# Patient Record
Sex: MALE | Race: WHITE | NOT HISPANIC OR LATINO | Employment: UNEMPLOYED | URBAN - METROPOLITAN AREA
[De-identification: names, ages, dates, MRNs, and addresses within clinical notes are randomized per-mention and may not be internally consistent; named-entity substitution may affect disease eponyms.]

---

## 2021-03-05 ENCOUNTER — HOSPITAL ENCOUNTER (OUTPATIENT)
Dept: OTHER | Facility: HOSPITAL | Age: 31
Discharge: HOME | End: 2021-03-05

## 2021-03-05 LAB
25(OH)D3 SERPL-MCNC: 30.8 NG/ML (ref 30–100)
ALBUMIN SERPL-MCNC: 4.5 G/DL (ref 3.5–5)
ALBUMIN/GLOB SERPL: 1.8 {RATIO} (ref 1.4–2.6)
ALP SERPL-CCNC: 95 U/L (ref 53–128)
ALT SERPL-CCNC: 45 U/L (ref 10–40)
ANION GAP SERPL CALC-SCNC: 14 MMOL/L (ref 8–19)
AST SERPL-CCNC: 23 U/L (ref 15–50)
BASOPHILS # BLD MANUAL: 0.03 10*3/UL (ref 0–0.2)
BASOPHILS NFR BLD MANUAL: 0.5 % (ref 0–3)
BILIRUB SERPL-MCNC: 0.36 MG/DL (ref 0.2–1.3)
BUN SERPL-MCNC: 7 MG/DL (ref 5–25)
BUN/CREAT SERPL: 6 {RATIO} (ref 6–20)
CALCIUM SERPL-MCNC: 9.7 MG/DL (ref 8.7–10.4)
CHLORIDE SERPL-SCNC: 103 MMOL/L (ref 99–111)
CHOLEST SERPL-MCNC: 235 MG/DL (ref 107–200)
CHOLEST/HDLC SERPL: 6.4 {RATIO} (ref 3–6)
CONV BILI, CONJUGATED: <0.2 MG/DL (ref 0–0.6)
CONV CO2: 28 MMOL/L (ref 22–32)
CONV TOTAL PROTEIN: 7 G/DL (ref 6.3–8.2)
CONV UNCONJUGATED BILIRUBIN: 0.2 MG/DL (ref 0–1.1)
CREAT UR-MCNC: 1.26 MG/DL (ref 0.7–1.2)
DEPRECATED RDW RBC AUTO: 47.2 FL
EOSINOPHIL # BLD MANUAL: 0.15 10*3/UL (ref 0–0.7)
EOSINOPHIL NFR BLD MANUAL: 2.3 % (ref 0–7)
ERYTHROCYTE [DISTWIDTH] IN BLOOD BY AUTOMATED COUNT: 13.9 % (ref 11.5–14.5)
EST. AVERAGE GLUCOSE BLD GHB EST-MCNC: 108 MG/DL
FOLATE SERPL-MCNC: 2.5 NG/ML (ref 4.8–20)
GFR SERPLBLD BASED ON 1.73 SQ M-ARVRAT: >60 ML/MIN/{1.73_M2}
GLOBULIN UR ELPH-MCNC: 2.5 G/DL (ref 2–3.5)
GLUCOSE SERPL-MCNC: 101 MG/DL (ref 70–99)
GRANS (ABSOLUTE): 3.45 10*3/UL (ref 2–8)
GRANS: 53.8 % (ref 30–85)
HBA1C MFR BLD: 15.2 G/DL (ref 14–18)
HBA1C MFR BLD: 5.4 % (ref 3.5–5.7)
HCT VFR BLD AUTO: 44.9 % (ref 42–52)
HDLC SERPL-MCNC: 37 MG/DL (ref 40–60)
IMM GRANULOCYTES # BLD: 0.03 10*3/UL (ref 0–0.54)
IMM GRANULOCYTES NFR BLD: 0.5 % (ref 0–0.43)
LDLC SERPL CALC-MCNC: 121 MG/DL (ref 70–100)
LYMPHOCYTES # BLD MANUAL: 2.23 10*3/UL (ref 1–5)
LYMPHOCYTES NFR BLD MANUAL: 8.1 % (ref 3–10)
MCH RBC QN AUTO: 31 PG (ref 27–31)
MCHC RBC AUTO-ENTMCNC: 33.9 G/DL (ref 33–37)
MCV RBC AUTO: 91.4 FL (ref 80–96)
MONOCYTES # BLD AUTO: 0.52 10*3/UL (ref 0.2–1.2)
OSMOLALITY SERPL CALC.SUM OF ELEC: 290 MOSM/KG (ref 273–304)
PLATELET # BLD AUTO: 280 10*3/UL (ref 130–400)
PMV BLD AUTO: 8.9 FL (ref 7.4–10.4)
POTASSIUM SERPL-SCNC: 4.1 MMOL/L (ref 3.5–5.3)
RBC # BLD AUTO: 4.91 10*6/UL (ref 4.7–6.1)
SODIUM SERPL-SCNC: 141 MMOL/L (ref 135–147)
T4 FREE SERPL-MCNC: 1.3 NG/DL (ref 0.9–1.8)
TRIGL SERPL-MCNC: 447 MG/DL (ref 40–150)
TSH SERPL-ACNC: 1.24 M[IU]/L (ref 0.27–4.2)
VARIANT LYMPHS NFR BLD MANUAL: 34.8 % (ref 20–45)
WBC # BLD AUTO: 6.41 10*3/UL (ref 4.8–10.8)

## 2021-03-07 LAB — T3FREE SERPL-MCNC: 3.6 PG/ML (ref 2–4.4)

## 2021-07-06 ENCOUNTER — OFFICE VISIT (OUTPATIENT)
Dept: FAMILY MEDICINE CLINIC | Age: 31
End: 2021-07-06

## 2021-07-06 ENCOUNTER — LAB (OUTPATIENT)
Dept: LAB | Facility: HOSPITAL | Age: 31
End: 2021-07-06

## 2021-07-06 VITALS
HEIGHT: 68 IN | BODY MASS INDEX: 29.13 KG/M2 | DIASTOLIC BLOOD PRESSURE: 83 MMHG | HEART RATE: 98 BPM | SYSTOLIC BLOOD PRESSURE: 126 MMHG | WEIGHT: 192.2 LBS

## 2021-07-06 DIAGNOSIS — E53.8 FOLATE DEFICIENCY: ICD-10-CM

## 2021-07-06 DIAGNOSIS — E78.5 HYPERLIPIDEMIA, UNSPECIFIED HYPERLIPIDEMIA TYPE: ICD-10-CM

## 2021-07-06 DIAGNOSIS — E78.5 HYPERLIPIDEMIA, UNSPECIFIED HYPERLIPIDEMIA TYPE: Primary | ICD-10-CM

## 2021-07-06 DIAGNOSIS — R29.818 SUSPECTED SLEEP APNEA: ICD-10-CM

## 2021-07-06 DIAGNOSIS — F20.0 PARANOID SCHIZOPHRENIA (HCC): ICD-10-CM

## 2021-07-06 PROBLEM — K21.9 GERD (GASTROESOPHAGEAL REFLUX DISEASE): Status: ACTIVE | Noted: 2021-07-06

## 2021-07-06 PROBLEM — Z98.890 S/P ATRIOVENTRICULAR NODAL ABLATION: Status: ACTIVE | Noted: 2021-07-06

## 2021-07-06 PROBLEM — I47.10 SVT (SUPRAVENTRICULAR TACHYCARDIA): Status: ACTIVE | Noted: 2021-07-06

## 2021-07-06 PROBLEM — I47.1 SVT (SUPRAVENTRICULAR TACHYCARDIA) (HCC): Status: ACTIVE | Noted: 2021-07-06

## 2021-07-06 PROBLEM — F51.01 PRIMARY INSOMNIA: Status: ACTIVE | Noted: 2021-07-06

## 2021-07-06 LAB
ALBUMIN SERPL-MCNC: 4.6 G/DL (ref 3.5–5.2)
ALBUMIN/GLOB SERPL: 1.9 G/DL
ALP SERPL-CCNC: 95 U/L (ref 39–117)
ALT SERPL W P-5'-P-CCNC: 45 U/L (ref 1–41)
ANION GAP SERPL CALCULATED.3IONS-SCNC: 10.9 MMOL/L (ref 5–15)
AST SERPL-CCNC: 24 U/L (ref 1–40)
BASOPHILS # BLD AUTO: 0.04 10*3/MM3 (ref 0–0.2)
BASOPHILS NFR BLD AUTO: 0.5 % (ref 0–1.5)
BILIRUB SERPL-MCNC: 0.4 MG/DL (ref 0–1.2)
BUN SERPL-MCNC: 8 MG/DL (ref 6–20)
BUN/CREAT SERPL: 7 (ref 7–25)
CALCIUM SPEC-SCNC: 9.4 MG/DL (ref 8.6–10.5)
CHLORIDE SERPL-SCNC: 102 MMOL/L (ref 98–107)
CHOLEST SERPL-MCNC: 211 MG/DL (ref 0–200)
CO2 SERPL-SCNC: 26.1 MMOL/L (ref 22–29)
CREAT SERPL-MCNC: 1.15 MG/DL (ref 0.76–1.27)
DEPRECATED RDW RBC AUTO: 46.4 FL (ref 37–54)
EOSINOPHIL # BLD AUTO: 0.08 10*3/MM3 (ref 0–0.4)
EOSINOPHIL NFR BLD AUTO: 1.1 % (ref 0.3–6.2)
ERYTHROCYTE [DISTWIDTH] IN BLOOD BY AUTOMATED COUNT: 13.6 % (ref 12.3–15.4)
FOLATE SERPL-MCNC: 7.26 NG/ML (ref 4.78–24.2)
GFR SERPL CREATININE-BSD FRML MDRD: 73 ML/MIN/1.73
GLOBULIN UR ELPH-MCNC: 2.4 GM/DL
GLUCOSE SERPL-MCNC: 99 MG/DL (ref 65–99)
HCT VFR BLD AUTO: 44.8 % (ref 37.5–51)
HDLC SERPL-MCNC: 43 MG/DL (ref 40–60)
HGB BLD-MCNC: 15.4 G/DL (ref 13–17.7)
IMM GRANULOCYTES # BLD AUTO: 0.02 10*3/MM3 (ref 0–0.05)
IMM GRANULOCYTES NFR BLD AUTO: 0.3 % (ref 0–0.5)
LDLC SERPL CALC-MCNC: 106 MG/DL (ref 0–100)
LDLC/HDLC SERPL: 2.2 {RATIO}
LYMPHOCYTES # BLD AUTO: 2.67 10*3/MM3 (ref 0.7–3.1)
LYMPHOCYTES NFR BLD AUTO: 36.3 % (ref 19.6–45.3)
MCH RBC QN AUTO: 31.6 PG (ref 26.6–33)
MCHC RBC AUTO-ENTMCNC: 34.4 G/DL (ref 31.5–35.7)
MCV RBC AUTO: 92 FL (ref 79–97)
MONOCYTES # BLD AUTO: 0.59 10*3/MM3 (ref 0.1–0.9)
MONOCYTES NFR BLD AUTO: 8 % (ref 5–12)
NEUTROPHILS NFR BLD AUTO: 3.95 10*3/MM3 (ref 1.7–7)
NEUTROPHILS NFR BLD AUTO: 53.8 % (ref 42.7–76)
PLATELET # BLD AUTO: 274 10*3/MM3 (ref 140–450)
PMV BLD AUTO: 9.6 FL (ref 6–12)
POTASSIUM SERPL-SCNC: 3.6 MMOL/L (ref 3.5–5.2)
PROT SERPL-MCNC: 7 G/DL (ref 6–8.5)
RBC # BLD AUTO: 4.87 10*6/MM3 (ref 4.14–5.8)
SODIUM SERPL-SCNC: 139 MMOL/L (ref 136–145)
TRIGL SERPL-MCNC: 367 MG/DL (ref 0–150)
VLDLC SERPL-MCNC: 62 MG/DL (ref 5–40)
WBC # BLD AUTO: 7.35 10*3/MM3 (ref 3.4–10.8)

## 2021-07-06 PROCEDURE — 82746 ASSAY OF FOLIC ACID SERUM: CPT

## 2021-07-06 PROCEDURE — 99204 OFFICE O/P NEW MOD 45 MIN: CPT | Performed by: FAMILY MEDICINE

## 2021-07-06 PROCEDURE — 80061 LIPID PANEL: CPT

## 2021-07-06 PROCEDURE — 85025 COMPLETE CBC W/AUTO DIFF WBC: CPT

## 2021-07-06 PROCEDURE — 36415 COLL VENOUS BLD VENIPUNCTURE: CPT

## 2021-07-06 PROCEDURE — 80053 COMPREHEN METABOLIC PANEL: CPT

## 2021-07-06 RX ORDER — OMEPRAZOLE 20 MG/1
20 CAPSULE, DELAYED RELEASE ORAL DAILY
COMMUNITY
End: 2021-09-03 | Stop reason: SDUPTHER

## 2021-07-06 RX ORDER — FOLIC ACID 1 MG/1
1 TABLET ORAL DAILY
COMMUNITY
Start: 2021-06-07

## 2021-07-06 RX ORDER — BUSPIRONE HYDROCHLORIDE 7.5 MG/1
7.5 TABLET ORAL 3 TIMES DAILY
COMMUNITY
Start: 2021-06-24

## 2021-07-06 RX ORDER — CHLORAL HYDRATE 500 MG
CAPSULE ORAL
COMMUNITY

## 2021-07-06 RX ORDER — FLUOXETINE HYDROCHLORIDE 20 MG/1
60 CAPSULE ORAL DAILY
COMMUNITY
Start: 2021-06-18

## 2021-07-06 RX ORDER — TRAZODONE HYDROCHLORIDE 100 MG/1
.5-1 TABLET ORAL NIGHTLY PRN
COMMUNITY
Start: 2021-06-24

## 2021-07-06 RX ORDER — BUPROPION HYDROCHLORIDE 150 MG/1
150 TABLET, EXTENDED RELEASE ORAL 2 TIMES DAILY
COMMUNITY
Start: 2021-06-24

## 2021-07-06 RX ORDER — OLANZAPINE 20 MG/1
0.5 TABLET ORAL 2 TIMES DAILY
COMMUNITY
Start: 2021-06-24 | End: 2021-09-03

## 2021-07-06 NOTE — ASSESSMENT & PLAN NOTE
Recently diagnosed.  Not currently on medications.  Repeat lipid panel ordered today.  We will treat if indicated.

## 2021-07-06 NOTE — ASSESSMENT & PLAN NOTE
Stable.  Continue to follow psychiatry as directed.  Continue Wellbutrin 50 mg twice daily, BuSpar 7.5 mg 3 times daily, Prozac 30 mg daily and Zyprexa 0.5 mg twice daily.  Continue trazodone  mg nightly for sleep.

## 2021-07-06 NOTE — PROGRESS NOTES
"[facility]  Office Visit Note    Albert Black presents to [facility]  Encounter Date: 07/06/2021     Chief Complaint  Establish Care (New patient to Dr. Salguero, last PCP was ), Hyperlipidemia (Concerns of high cholesterol), Results (Recently had labs done and states folic acid is low), and Sleeping Problem (Snores, \"stops breathing and choking and coughing in the middle of the night\" States always fatigued)    Subjective    History of Present Illness:  Albert presents clinic today to establish care.  1.  Hyperlipidemia: Catracho has a history of high cholesterol.  He is not currently on any prescription medications but does take omega-3 fatty acids.  He has a strong family history of high cholesterol as well.    2.  Paranoid schizophrenia: Catracho has a longstanding history of mental health issues and is recently been diagnosed with paranoid schizophrenia with schizoaffective disorder.  His current regimen includes Wellbutrin 150 mg twice daily, BuSpar 7.5 mg 3 times daily, Prozac 60 mg daily and Zyprexa 0.5 mg twice daily as needed.  He also takes trazodone 50 to 100 mg nightly for sleep.  He says his symptoms are currently stable.  He denies any auditory or visual hallucinations.  No SI or HI.    3.  Sleep apnea: Catracho reports that no matter how long he stays in bed he still feels fatigue during the day.  He often will wake several times at night gasping for air.  His significant other reports that he snores loudly and often has nighttime apneas.  He is requesting a referral for sleep study.    4.  Folate deficiency: Catracho has a prior history of folate deficiency found incidentally on labs.  He is currently taking folic acid 1 mg daily.  He would like his levels rechecked today.    Review of Systems:  Review of Systems   Constitutional: Positive for fatigue. Negative for chills and fever.   HENT: Negative for congestion.    Eyes: Negative for blurred vision. " "  Respiratory: Positive for apnea. Negative for cough and shortness of breath.         Positive for snoring   Cardiovascular: Negative for chest pain, palpitations and leg swelling.   Gastrointestinal: Negative for abdominal pain, constipation, diarrhea, nausea and vomiting.   Endocrine: Negative for cold intolerance, heat intolerance, polydipsia, polyphagia and polyuria.   Genitourinary: Negative for dysuria and hematuria.   Musculoskeletal: Negative for arthralgias and myalgias.   Skin: Negative for rash.   Neurological: Negative for dizziness, weakness, numbness and headache.   Hematological: Does not bruise/bleed easily.   Psychiatric/Behavioral: Positive for sleep disturbance. Negative for agitation, behavioral problems, hallucinations, suicidal ideas and depressed mood. The patient is not nervous/anxious.         Objective   Vital Signs:  /83 (BP Location: Left arm, Patient Position: Sitting, Cuff Size: Adult)   Pulse 98   Ht 172.7 cm (68\")   Wt 87.2 kg (192 lb 3.2 oz)   BMI 29.22 kg/m²      Physical Examination:  Physical Exam  Vitals reviewed.   Constitutional:       General: He is not in acute distress.     Appearance: Normal appearance.   HENT:      Head: Normocephalic and atraumatic.   Eyes:      Conjunctiva/sclera: Conjunctivae normal.   Cardiovascular:      Rate and Rhythm: Normal rate and regular rhythm.      Heart sounds: No murmur heard.     Pulmonary:      Effort: Pulmonary effort is normal. No respiratory distress.      Breath sounds: Normal breath sounds.   Musculoskeletal:      Right lower leg: No edema.      Left lower leg: No edema.   Skin:     General: Skin is warm and dry.      Findings: No rash.   Neurological:      General: No focal deficit present.      Mental Status: He is alert and oriented to person, place, and time.   Psychiatric:         Mood and Affect: Mood normal.         Behavior: Behavior normal.         Result Review   The following data was reviewed by: Morris Salguero, " MD on 07/06/2021:  Hemoglobin A1C With EAG (03/05/2021 11:28)  Lipid Panel (03/05/2021 11:28)  Comprehensive Metabolic Panel (03/05/2021 11:28)  CBC & Differential (03/05/2021 11:28)  Folate (03/05/2021 11:28)  Thyroid Profile (Free T4 with TSH) (03/05/2021 11:28)  T3, Free (03/05/2021 11:28)  Vitamin D 25 Hydroxy (03/05/2021 11:28)  Bilirubin, Total & Direct (03/05/2021 11:28)  LDL (Measured) (03/05/2021 11:28)    Procedures:    No procedures associated with this visit.     Assessment and Plan:  Diagnoses and all orders for this visit:    1. Hyperlipidemia, unspecified hyperlipidemia type (Primary)  Assessment & Plan:  Recently diagnosed.  Not currently on medications.  Repeat lipid panel ordered today.  We will treat if indicated.    Orders:  -     Comprehensive metabolic panel; Future  -     Lipid panel; Future    2. Folate deficiency  Assessment & Plan:  Not controlled.  Continue folic acid 1 mg daily.  Repeat folate level ordered today.    Orders:  -     CBC w AUTO Differential; Future  -     Folate; Future    3. Suspected sleep apnea  -     Likely diagnosis.  Stop bang score of 4 is indicative of high risk of obstructive sleep apnea.  Referral placed for sleep study.  - Ambulatory Referral to Sleep Medicine    4. Paranoid schizophrenia (CMS/HCC)  Assessment & Plan:  Stable.  Continue to follow psychiatry as directed.  Continue Wellbutrin 50 mg twice daily, BuSpar 7.5 mg 3 times daily, Prozac 30 mg daily and Zyprexa 0.5 mg twice daily.  Continue trazodone  mg nightly for sleep.        Return in about 4 weeks (around 8/3/2021).    Patient was given instructions and counseling regarding his condition or for health maintenance advice. Please see specific information pulled into the AVS if appropriate.      Morris Salguero MD   7/6/2021

## 2021-09-03 ENCOUNTER — OFFICE VISIT (OUTPATIENT)
Dept: FAMILY MEDICINE CLINIC | Age: 31
End: 2021-09-03

## 2021-09-03 ENCOUNTER — LAB (OUTPATIENT)
Dept: LAB | Facility: HOSPITAL | Age: 31
End: 2021-09-03

## 2021-09-03 VITALS
WEIGHT: 172.8 LBS | DIASTOLIC BLOOD PRESSURE: 80 MMHG | BODY MASS INDEX: 26.19 KG/M2 | HEIGHT: 68 IN | HEART RATE: 68 BPM | SYSTOLIC BLOOD PRESSURE: 134 MMHG

## 2021-09-03 DIAGNOSIS — K21.9 GASTROESOPHAGEAL REFLUX DISEASE WITHOUT ESOPHAGITIS: ICD-10-CM

## 2021-09-03 DIAGNOSIS — F20.0 PARANOID SCHIZOPHRENIA (HCC): ICD-10-CM

## 2021-09-03 DIAGNOSIS — G47.30 SLEEP APNEA, UNSPECIFIED TYPE: ICD-10-CM

## 2021-09-03 DIAGNOSIS — E78.5 HYPERLIPIDEMIA, UNSPECIFIED HYPERLIPIDEMIA TYPE: Primary | ICD-10-CM

## 2021-09-03 DIAGNOSIS — E53.8 FOLATE DEFICIENCY: ICD-10-CM

## 2021-09-03 LAB — UREA BREATH TEST QL: NEGATIVE

## 2021-09-03 PROCEDURE — 83013 H PYLORI (C-13) BREATH: CPT

## 2021-09-03 PROCEDURE — 99214 OFFICE O/P EST MOD 30 MIN: CPT | Performed by: FAMILY MEDICINE

## 2021-09-03 RX ORDER — OLANZAPINE 10 MG/1
1 TABLET ORAL 2 TIMES DAILY
COMMUNITY
Start: 2021-08-28

## 2021-09-03 RX ORDER — OMEPRAZOLE 40 MG/1
40 CAPSULE, DELAYED RELEASE ORAL DAILY
Qty: 90 CAPSULE | Refills: 0 | Status: SHIPPED | OUTPATIENT
Start: 2021-09-03

## 2021-09-03 NOTE — PROGRESS NOTES
[facility]  Office Visit Note    Albert Black presents to [facility]  Encounter Date: 09/03/2021     Chief Complaint  Hyperlipidemia (Follow up)    Subjective    History of Present Illness:  1.  Hyperlipidemia: Catracho has a history of high cholesterol.  He is not currently on any prescription medications but does take omega-3 fatty acids.  He has a strong family history of high cholesterol as well.     2.  Paranoid schizophrenia: Catracho has a longstanding history of mental health issues and is recently been diagnosed with paranoid schizophrenia with schizoaffective disorder.  His current regimen includes Wellbutrin 150 mg twice daily, BuSpar 7.5 mg 3 times daily, Prozac 60 mg daily and Zyprexa 0.5 mg twice daily as needed.  He also takes trazodone 50 to 100 mg nightly for sleep.  He says his symptoms are currently stable.  He denies any auditory or visual hallucinations.  No SI or HI.     3.  Sleep apnea: Catracho reports that no matter how long he stays in bed he still feels fatigue during the day.  He often will wake several times at night gasping for air.  His significant other reports that he snores loudly and often has nighttime apneas.  He is requesting a referral for sleep study.     4.  Folate deficiency: Catracho has a prior history of folate deficiency found incidentally on labs.  He is currently taking folic acid 1 mg daily.  He would like his levels rechecked today.    5.  Catracho reports that he is been experiencing abdominal pain after eating.  He also notices that he is bloated in the morning.  He has associated heartburn.  He currently takes omeprazole 20 mg daily.  No vomiting but he does have intermittent nausea after eating.  No melena or hematochezia.    Review of Systems:  Review of Systems   Constitutional: Positive for fatigue. Negative for chills and fever.   HENT: Negative for congestion.    Eyes: Negative for blurred vision.   Respiratory:  "Positive for apnea. Negative for cough and shortness of breath.         Positive for snoring   Cardiovascular: Negative for chest pain, palpitations and leg swelling.   Gastrointestinal: Positive for abdominal pain and GERD. Negative for blood in stool, constipation, diarrhea, nausea and vomiting.   Musculoskeletal: Negative for arthralgias and myalgias.   Skin: Negative for rash.   Neurological: Negative for dizziness, weakness, numbness and headache.   Psychiatric/Behavioral: Positive for sleep disturbance. Negative for agitation, behavioral problems, hallucinations, suicidal ideas and depressed mood. The patient is not nervous/anxious.         Objective   Vital Signs:  /80 (BP Location: Left arm, Patient Position: Sitting, Cuff Size: Adult)   Pulse 68   Ht 172.7 cm (68\")   Wt 78.4 kg (172 lb 12.8 oz)   BMI 26.27 kg/m²      Physical Examination:  Physical Exam  Vitals reviewed.   Constitutional:       General: He is not in acute distress.     Appearance: Normal appearance.   HENT:      Head: Normocephalic and atraumatic.   Eyes:      Conjunctiva/sclera: Conjunctivae normal.   Cardiovascular:      Rate and Rhythm: Normal rate and regular rhythm.      Heart sounds: No murmur heard.     Pulmonary:      Effort: Pulmonary effort is normal. No respiratory distress.      Breath sounds: Normal breath sounds.   Abdominal:      General: There is no distension.      Palpations: Abdomen is soft.      Tenderness: There is no abdominal tenderness.   Musculoskeletal:      Right lower leg: No edema.      Left lower leg: No edema.   Skin:     General: Skin is warm and dry.      Findings: No rash.   Neurological:      General: No focal deficit present.      Mental Status: He is alert and oriented to person, place, and time.   Psychiatric:         Mood and Affect: Mood normal.         Behavior: Behavior normal.         Result Review   The following data was reviewed by: Morris Salguero MD on 09/03/2021:  CBC w AUTO " Differential (07/06/2021 14:02)  Folate (07/06/2021 14:02)  Lipid panel (07/06/2021 14:02)  Comprehensive metabolic panel (07/06/2021 14:02)    Procedures:    No procedures associated with this visit.     Assessment and Plan:  Diagnoses and all orders for this visit:    1. Hyperlipidemia, unspecified hyperlipidemia type (Primary)  Assessment & Plan:  Mildly elevated. Not currently on medications.  Continue to monitor      2. Folate deficiency  Assessment & Plan:  Controlled. Continue folic acid 1 mg daily.        3. Paranoid schizophrenia (CMS/[facility])  Assessment & Plan:  Stable.  Continue to follow psychiatry as directed.  Continue Wellbutrin 50 mg twice daily, BuSpar 7.5 mg 3 times daily, Prozac 30 mg daily and Zyprexa 0.5 mg twice daily.  Continue trazodone  mg nightly for sleep.      4. Sleep apnea, unspecified type  Assessment & Plan:  Likely diagnosis.  Stop bang score 4.  He has been referred to sleep medicine and has an appointment set up for 9/22      5. Gastroesophageal reflux disease without esophagitis  Assessment & Plan:  Newly diagnosed.  With bloating and postprandial pain, there would also be concerned for possible PUD.  Will increase omeprazole to 40 mg daily.  He can add Pepcid 20 mg twice daily as needed.  H. pylori testing today.  If symptoms persist, will consider GI referral for EGD evaluation.    Orders:  -     H. Pylori Breath Test - Breath, Lung; Future    Other orders  -     omeprazole (priLOSEC) 40 MG capsule; Take 1 capsule by mouth Daily.  Dispense: 90 capsule; Refill: 0      Return in about 3 months (around 12/3/2021).    Patient was given instructions and counseling regarding his condition or for health maintenance advice. Please see specific information pulled into the AVS if appropriate.      Morris Salguero MD   9/3/2021

## 2021-09-03 NOTE — ASSESSMENT & PLAN NOTE
Newly diagnosed.  With bloating and postprandial pain, there would also be concerned for possible PUD.  Will increase omeprazole to 40 mg daily.  He can add Pepcid 20 mg twice daily as needed.  H. pylori testing today.  If symptoms persist, will consider GI referral for EGD evaluation.

## 2021-09-03 NOTE — ASSESSMENT & PLAN NOTE
Likely diagnosis.  Stop bang score 4.  He has been referred to sleep medicine and has an appointment set up for 9/22

## 2023-10-30 ENCOUNTER — LAB (OUTPATIENT)
Dept: LAB | Facility: HOSPITAL | Age: 33
End: 2023-10-30
Payer: MEDICARE

## 2023-10-30 ENCOUNTER — OFFICE VISIT (OUTPATIENT)
Dept: FAMILY MEDICINE CLINIC | Age: 33
End: 2023-10-30
Payer: MEDICARE

## 2023-10-30 VITALS
BODY MASS INDEX: 22.94 KG/M2 | HEIGHT: 68 IN | OXYGEN SATURATION: 96 % | WEIGHT: 151.4 LBS | SYSTOLIC BLOOD PRESSURE: 129 MMHG | HEART RATE: 79 BPM | DIASTOLIC BLOOD PRESSURE: 92 MMHG | TEMPERATURE: 98.4 F

## 2023-10-30 DIAGNOSIS — K92.1 BLOOD IN STOOL: ICD-10-CM

## 2023-10-30 DIAGNOSIS — Z13.29 THYROID DISORDER SCREEN: ICD-10-CM

## 2023-10-30 DIAGNOSIS — K62.3 RECTAL PROLAPSE: ICD-10-CM

## 2023-10-30 DIAGNOSIS — Z13.220 SCREENING CHOLESTEROL LEVEL: ICD-10-CM

## 2023-10-30 DIAGNOSIS — Z30.09 VASECTOMY EVALUATION: Primary | ICD-10-CM

## 2023-10-30 DIAGNOSIS — F17.210 CIGARETTE NICOTINE DEPENDENCE WITHOUT COMPLICATION: ICD-10-CM

## 2023-10-30 DIAGNOSIS — R05.3 CHRONIC COUGH: ICD-10-CM

## 2023-10-30 LAB
ALBUMIN SERPL-MCNC: 4.5 G/DL (ref 3.5–5.2)
ALBUMIN/GLOB SERPL: 2 G/DL
ALP SERPL-CCNC: 110 U/L (ref 39–117)
ALT SERPL W P-5'-P-CCNC: 22 U/L (ref 1–41)
ANION GAP SERPL CALCULATED.3IONS-SCNC: 9 MMOL/L (ref 5–15)
AST SERPL-CCNC: 13 U/L (ref 1–40)
BASOPHILS # BLD AUTO: 0.03 10*3/MM3 (ref 0–0.2)
BASOPHILS NFR BLD AUTO: 0.5 % (ref 0–1.5)
BILIRUB SERPL-MCNC: 0.3 MG/DL (ref 0–1.2)
BUN SERPL-MCNC: 6 MG/DL (ref 6–20)
BUN/CREAT SERPL: 4.7 (ref 7–25)
CALCIUM SPEC-SCNC: 10.1 MG/DL (ref 8.6–10.5)
CHLORIDE SERPL-SCNC: 101 MMOL/L (ref 98–107)
CHOLEST SERPL-MCNC: 195 MG/DL (ref 0–200)
CO2 SERPL-SCNC: 29 MMOL/L (ref 22–29)
CREAT SERPL-MCNC: 1.27 MG/DL (ref 0.76–1.27)
DEPRECATED RDW RBC AUTO: 42.8 FL (ref 37–54)
EGFRCR SERPLBLD CKD-EPI 2021: 75.6 ML/MIN/1.73
EOSINOPHIL # BLD AUTO: 0.17 10*3/MM3 (ref 0–0.4)
EOSINOPHIL NFR BLD AUTO: 2.6 % (ref 0.3–6.2)
ERYTHROCYTE [DISTWIDTH] IN BLOOD BY AUTOMATED COUNT: 13 % (ref 12.3–15.4)
GLOBULIN UR ELPH-MCNC: 2.3 GM/DL
GLUCOSE SERPL-MCNC: 102 MG/DL (ref 65–99)
HCT VFR BLD AUTO: 48.8 % (ref 37.5–51)
HDLC SERPL-MCNC: 35 MG/DL (ref 40–60)
HGB BLD-MCNC: 15.7 G/DL (ref 13–17.7)
IMM GRANULOCYTES # BLD AUTO: 0.01 10*3/MM3 (ref 0–0.05)
IMM GRANULOCYTES NFR BLD AUTO: 0.2 % (ref 0–0.5)
LDLC SERPL CALC-MCNC: 108 MG/DL (ref 0–100)
LDLC/HDLC SERPL: 2.86 {RATIO}
LYMPHOCYTES # BLD AUTO: 1.84 10*3/MM3 (ref 0.7–3.1)
LYMPHOCYTES NFR BLD AUTO: 28.6 % (ref 19.6–45.3)
MCH RBC QN AUTO: 28.4 PG (ref 26.6–33)
MCHC RBC AUTO-ENTMCNC: 32.2 G/DL (ref 31.5–35.7)
MCV RBC AUTO: 88.2 FL (ref 79–97)
MONOCYTES # BLD AUTO: 0.49 10*3/MM3 (ref 0.1–0.9)
MONOCYTES NFR BLD AUTO: 7.6 % (ref 5–12)
NEUTROPHILS NFR BLD AUTO: 3.89 10*3/MM3 (ref 1.7–7)
NEUTROPHILS NFR BLD AUTO: 60.5 % (ref 42.7–76)
PLATELET # BLD AUTO: 336 10*3/MM3 (ref 140–450)
PMV BLD AUTO: 9.5 FL (ref 6–12)
POTASSIUM SERPL-SCNC: 4.4 MMOL/L (ref 3.5–5.2)
PROT SERPL-MCNC: 6.8 G/DL (ref 6–8.5)
RBC # BLD AUTO: 5.53 10*6/MM3 (ref 4.14–5.8)
SODIUM SERPL-SCNC: 139 MMOL/L (ref 136–145)
TRIGL SERPL-MCNC: 299 MG/DL (ref 0–150)
TSH SERPL DL<=0.05 MIU/L-ACNC: 1.87 UIU/ML (ref 0.27–4.2)
VLDLC SERPL-MCNC: 52 MG/DL (ref 5–40)
WBC NRBC COR # BLD: 6.43 10*3/MM3 (ref 3.4–10.8)

## 2023-10-30 PROCEDURE — 36415 COLL VENOUS BLD VENIPUNCTURE: CPT

## 2023-10-30 PROCEDURE — 99214 OFFICE O/P EST MOD 30 MIN: CPT | Performed by: NURSE PRACTITIONER

## 2023-10-30 PROCEDURE — 80053 COMPREHEN METABOLIC PANEL: CPT

## 2023-10-30 PROCEDURE — 1159F MED LIST DOCD IN RCRD: CPT | Performed by: NURSE PRACTITIONER

## 2023-10-30 PROCEDURE — 85025 COMPLETE CBC W/AUTO DIFF WBC: CPT

## 2023-10-30 PROCEDURE — 80061 LIPID PANEL: CPT

## 2023-10-30 PROCEDURE — 1160F RVW MEDS BY RX/DR IN RCRD: CPT | Performed by: NURSE PRACTITIONER

## 2023-10-30 PROCEDURE — 84443 ASSAY THYROID STIM HORMONE: CPT

## 2023-10-30 RX ORDER — OMEPRAZOLE 20 MG/1
20 CAPSULE, DELAYED RELEASE ORAL DAILY
COMMUNITY

## 2023-10-30 RX ORDER — HYDROXYZINE 50 MG/1
50 TABLET, FILM COATED ORAL AS NEEDED
COMMUNITY

## 2023-10-30 RX ORDER — NALTREXONE HYDROCHLORIDE 50 MG/1
50 TABLET, FILM COATED ORAL DAILY
COMMUNITY

## 2023-10-30 NOTE — PROGRESS NOTES
"Chief Complaint  Rectal Bleeding (Blood with wiping and in toilet off and on), Cough (Bad cough in the morning), and Contraception (Would like referral for vasectomt)    Subjective          Albert Black presents to [facility] with multiple issues. He has had rectal bleeding intermittently over the last several weeks. States that he has had a chronic rectal prolapse that he is able to reduce with no issue. Strains with bowel movements.  He is also c/o chronic cough for months, worse in the am. He smokes 1/2 ppd since age 15. Denies fever, chills, nausea, vomiting, diarrhea, soa or chest pain.  Denies allergies. He would also like referral for vasectomy.       Objective   Vital Signs:   Vitals:    10/30/23 1024   BP: 129/92   BP Location: Right arm   Patient Position: Sitting   Cuff Size: Adult   Pulse: 79   Temp: 98.4 °F (36.9 °C)   TempSrc: Oral   SpO2: 96%   Weight: 68.7 kg (151 lb 6.4 oz)   Height: 172.7 cm (67.99\")       Physical Exam  Vitals reviewed.   Constitutional:       General: He is not in acute distress.     Appearance: Normal appearance. He is well-developed.   HENT:      Head: Normocephalic and atraumatic.   Eyes:      Conjunctiva/sclera: Conjunctivae normal.      Pupils: Pupils are equal, round, and reactive to light.   Cardiovascular:      Rate and Rhythm: Normal rate and regular rhythm.      Heart sounds: Normal heart sounds. No murmur heard.  Pulmonary:      Effort: Pulmonary effort is normal. No respiratory distress.      Breath sounds: Normal breath sounds.   Skin:     General: Skin is warm and dry.   Neurological:      Mental Status: He is alert and oriented to person, place, and time.   Psychiatric:         Mood and Affect: Mood and affect normal.         Behavior: Behavior normal.         Thought Content: Thought content normal.         Judgment: Judgment normal.          Result Review :              Assessment and Plan    Diagnoses and all orders for " this visit:    1. Vasectomy evaluation (Primary)  -     Ambulatory Referral to Urology    2. Blood in stool  Comments:  ED precautions.  Orders:  -     CBC Auto Differential; Future  -     Comprehensive Metabolic Panel; Future    3. Chronic cough  Comments:  Smoking cessation encouraged. CT ordered.    4. Rectal prolapse  Comments:  Referral to gastro. Go to ED with severe pain.  Orders:  -     Ambulatory Referral to Gastroenterology    5. Screening cholesterol level  Comments:  Will notify pt of results and treat accordingly.  Orders:  -     Lipid Panel; Future    6. Thyroid disorder screen  Comments:  Will notify pt of results and treat accordingly.  Orders:  -     TSH Rfx On Abnormal To Free T4; Future    7. Cigarette nicotine dependence without complication  Comments:  cessation encouraged. CT ordered.  Orders:  -     CT Chest With Contrast; Future    Pt v/u, agrees with plan of care and has no further questions upon d/c.     Follow Up    Return in about 3 months (around 1/30/2024) for Annual physical.  Patient was given instructions and counseling regarding his condition or for health maintenance advice. Please see specific information pulled into the AVS if appropriate.

## 2023-11-15 ENCOUNTER — HOSPITAL ENCOUNTER (OUTPATIENT)
Dept: CT IMAGING | Facility: HOSPITAL | Age: 33
Discharge: HOME | End: 2023-11-15
Admitting: NURSE PRACTITIONER
Payer: MEDICARE

## 2023-11-15 DIAGNOSIS — F17.210 CIGARETTE NICOTINE DEPENDENCE WITHOUT COMPLICATION: ICD-10-CM

## 2023-11-15 PROCEDURE — 25510000001 IOPAMIDOL PER 1 ML: Performed by: NURSE PRACTITIONER

## 2023-11-15 PROCEDURE — 71260 CT THORAX DX C+: CPT

## 2023-11-15 RX ADMIN — IOPAMIDOL 100 ML: 755 INJECTION, SOLUTION INTRAVENOUS at 12:50

## 2023-12-03 DIAGNOSIS — J98.4 PNEUMONITIS: Primary | ICD-10-CM

## 2023-12-03 RX ORDER — PREDNISONE 20 MG/1
40 TABLET ORAL DAILY
Qty: 10 TABLET | Refills: 0 | Status: SHIPPED | OUTPATIENT
Start: 2023-12-03

## 2024-01-29 ENCOUNTER — OFFICE VISIT (OUTPATIENT)
Dept: UROLOGY | Facility: CLINIC | Age: 34
End: 2024-01-29
Payer: MEDICARE

## 2024-01-29 VITALS — HEIGHT: 68 IN | WEIGHT: 151 LBS | RESPIRATION RATE: 17 BRPM | BODY MASS INDEX: 22.88 KG/M2

## 2024-01-29 DIAGNOSIS — Z30.09 STERILIZATION CONSULT: Primary | ICD-10-CM

## 2024-01-29 PROCEDURE — 1159F MED LIST DOCD IN RCRD: CPT | Performed by: UROLOGY

## 2024-01-29 PROCEDURE — 1160F RVW MEDS BY RX/DR IN RCRD: CPT | Performed by: UROLOGY

## 2024-01-29 PROCEDURE — 99203 OFFICE O/P NEW LOW 30 MIN: CPT | Performed by: UROLOGY

## 2024-01-29 RX ORDER — DIAZEPAM 5 MG/1
5 TABLET ORAL ONCE
Qty: 1 TABLET | Refills: 0 | Status: SHIPPED | OUTPATIENT
Start: 2024-01-29 | End: 2024-01-29

## 2024-01-29 NOTE — PROGRESS NOTES
Chief Complaint: Undesired fertility         History of Present Illness:  Albert Black is a 33 y.o. male presents for counseling regarding vasectomy for permanent sterilization. The procedure was discussed with the patient. Albert Black is aware that the procedure should be considered irreversible, although at times it can be reversed with success. Risks for the procedure including local effects of swelling, bleeding, pain, the possibility for recanalization, and continued fertility is also possible. Formation of a sperm granuloma also is a possibility. We discussed the procedure, preoperative preparation, and postoperative care. We also discussed the importance of continuing to use contraception post vasectomy, since the patient will not be sterile at that point. We stressed the importance of continuing to use contraception until a follow-up semen specimen is done that shows the absence of sperm. That specimen will normally be obtained eight weeks post-surgery. Albert Black is voluntarily requesting the procedure and understands that if it is successful he will be unable to father children.     No anticoagulation, no previous scrotal surgery, no cardiopulmonary history    Alert and oriented x3  No acute distress  Unlabored respirations  Nontender/nondistended  Normal circumcised phallus, bilateral descended testicles without mass.  Bilateral vas deferens palpable  Grossly oriented to person place and time judgment is intact      Assessment and Plan      Consult for sterilization      Plan    Instructions  The patient will schedule a vasectomy if he desires.   Handouts were provided, vasectomy  scanned into the EMR for reference.   Vasectomy is intended to be a permanent form of contraception.   Vasectomy does not produce immediate sterility.   Following vasectomy, another form of contraception is required until vas occlusion is confirmed by post-vasectomy semen analysis (PVSA).   Even after  vas occlusion is confirmed, vasectomy is not 100% reliable in preventing pregnancy.   The risk of pregnancy after vasectomy is approximately 1 in 2,000 for men who have no sperm in the semen on post-vasectomy semen analysis showing rare non-motile sperm (RNMS).   Repeat vasectomy is necessary in <1% of vasectomies, provided that a technique for vas occlusion known to have low occlusive failure rate has been used.   Patient's should refrain from ejaculation for approximately 1 week after vasectomy.   Options for fertility after vasectomy reversal and sperm retrieval with in vitro fertilization. These options are not always successful, and are very expensive.   The rates of surgical complications such as symptomatic hematoma and infection are 1-2%  Chronic scrotal pain associated with negative impact on quality of life occurs after vasectomy in about 1-2% of men. Few of these men require additional surgery.   Other permanent and non-permanent alternatives to vasectomy are available.  Patient voiced understanding of the above statements.   Electronically identified patient education materials provided electronically    Patient has decided to schedule a vasectomy.

## 2024-02-07 ENCOUNTER — PATIENT ROUNDING (BHMG ONLY) (OUTPATIENT)
Dept: SURGERY | Facility: CLINIC | Age: 34
End: 2024-02-07
Payer: MEDICARE

## 2024-02-07 NOTE — PROGRESS NOTES
February 7, 2024    Hello, may I speak with Albert Black?    My name is Sangeetha Fink      I am  with [facility]  [facility]  [address]  [place] [id number] [phone].    Before we get started may I verify your date of birth? 1990    I am calling to officially welcome you to our practice and ask about your recent visit. Is this a good time to talk? yes    Tell me about your visit with us. What things went well?  My appointment went well. The ladies at the check out desk were very helpful.        We're always looking for ways to make our patients' experiences even better. Do you have recommendations on ways we may improve?  no    Overall were you satisfied with your first visit to our practice? yes       I appreciate you taking the time to speak with me today. Is there anything else I can do for you? no      Thank you, and have a great day.

## 2024-02-27 ENCOUNTER — HOSPITAL ENCOUNTER (OUTPATIENT)
Dept: GENERAL RADIOLOGY | Facility: HOSPITAL | Age: 34
Discharge: HOME | End: 2024-02-27
Admitting: INTERNAL MEDICINE
Payer: MEDICARE

## 2024-02-27 ENCOUNTER — OFFICE VISIT (OUTPATIENT)
Dept: PULMONOLOGY | Facility: CLINIC | Age: 34
End: 2024-02-27
Payer: MEDICARE

## 2024-02-27 VITALS
SYSTOLIC BLOOD PRESSURE: 134 MMHG | DIASTOLIC BLOOD PRESSURE: 81 MMHG | OXYGEN SATURATION: 97 % | HEART RATE: 65 BPM | HEIGHT: 68 IN | TEMPERATURE: 98.2 F | WEIGHT: 148.4 LBS | RESPIRATION RATE: 16 BRPM | BODY MASS INDEX: 22.49 KG/M2

## 2024-02-27 DIAGNOSIS — R05.3 CHRONIC COUGH: Primary | ICD-10-CM

## 2024-02-27 DIAGNOSIS — R05.3 CHRONIC COUGH: ICD-10-CM

## 2024-02-27 DIAGNOSIS — R93.89 ABNORMAL CT OF THE CHEST: ICD-10-CM

## 2024-02-27 PROCEDURE — 1160F RVW MEDS BY RX/DR IN RCRD: CPT | Performed by: INTERNAL MEDICINE

## 2024-02-27 PROCEDURE — 99203 OFFICE O/P NEW LOW 30 MIN: CPT | Performed by: INTERNAL MEDICINE

## 2024-02-27 PROCEDURE — 71046 X-RAY EXAM CHEST 2 VIEWS: CPT

## 2024-02-27 PROCEDURE — 1159F MED LIST DOCD IN RCRD: CPT | Performed by: INTERNAL MEDICINE

## 2024-02-27 RX ORDER — DIAZEPAM 5 MG/1
5 TABLET ORAL
COMMUNITY
Start: 2024-01-31

## 2024-02-27 NOTE — PROGRESS NOTES
Pulmonary Consultation    Christiana Mtz APRN,    Thank you for asking me to see Albert Black for   Chief Complaint   Patient presents with    Establish Care    pneumoiitis    Cough    Wheezing   .      History of Present Illness  Albert Black is a 34 y.o. male with a PMH significant for ADHD presents for evaluation of chronic cough for the past 2 years off-and-on patient says that the cough is worse in the morning and he expectorate some mucoid sputum he denies any chest pain shortness of breath wheezing fever chills night sweats or weight loss      Tobacco use history:        Review of Systems: History obtained from chart review and the patient.  Review of Systems   Respiratory:  Positive for cough.    All other systems reviewed and are negative.    As described in the HPI. Otherwise, remainder of ROS (14 systems) were negative.    Patient Active Problem List   Diagnosis    Primary insomnia    GERD (gastroesophageal reflux disease)    Folate deficiency    HLD (hyperlipidemia)    SVT (supraventricular tachycardia)    S/P atrioventricular lauren ablation    Paranoid schizophrenia    Sleep apnea    Sterilization consult         Current Outpatient Medications:     buPROPion SR (WELLBUTRIN SR) 150 MG 12 hr tablet, Take 1 tablet by mouth 2 (Two) Times a Day., Disp: , Rfl:     busPIRone (BUSPAR) 7.5 MG tablet, Take 2 tablets by mouth 3 (Three) Times a Day., Disp: , Rfl:     FLUoxetine (PROzac) 20 MG capsule, Take 4 capsules by mouth Daily., Disp: , Rfl:     folic acid (FOLVITE) 1 MG tablet, Take 1 tablet by mouth Daily., Disp: , Rfl:     hydrOXYzine (ATARAX) 50 MG tablet, Take 1 tablet by mouth As Needed for Itching., Disp: , Rfl:     naltrexone (DEPADE) 50 MG tablet, Take 1 tablet by mouth Daily., Disp: , Rfl:     OLANZapine (zyPREXA) 10 MG tablet, Take 1 tablet by mouth 2 (two) times a day., Disp: , Rfl:     omeprazole (priLOSEC) 20 MG capsule, Take 1 capsule by mouth Daily., Disp: , Rfl:     traZODone  "(DESYREL) 100 MG tablet, Take 0.5-1 tablets by mouth At Night As Needed., Disp: , Rfl:     diazePAM (VALIUM) 5 MG tablet, Take 1 tablet by mouth. (Patient not taking: Reported on 2/27/2024), Disp: , Rfl:     predniSONE (DELTASONE) 20 MG tablet, Take 2 tablets by mouth Daily. (Patient not taking: Reported on 1/29/2024), Disp: 10 tablet, Rfl: 0    Allergies   Allergen Reactions    Lamictal [Lamotrigine] Rash       Past Medical History:   Diagnosis Date    ADHD     Asthma     Cough     Fever     Hand fracture, right     Jaw fracture     Low back pain     Schizophrenia, schizo-affective      Past Surgical History:   Procedure Laterality Date    CARDIAC ABLATION      due to svt at age 10    CHOLECYSTECTOMY       Social History     Socioeconomic History    Marital status: Single   Tobacco Use    Smoking status: Every Day     Packs/day: .25     Types: Cigarettes     Start date: 7/6/2006    Smokeless tobacco: Never    Tobacco comments:     Started age 15   Vaping Use    Vaping Use: Never used   Substance and Sexual Activity    Alcohol use: Not Currently     Comment: quit 1 month ago (9/2023) was drinking 1/2 pint daily.    Drug use: Yes     Types: Marijuana     Family History   Problem Relation Age of Onset    Asthma Mother     Cancer Maternal Grandmother         breast       No radiology results for the last 90 days.        Objective     Blood pressure 134/81, pulse 65, temperature 98.2 °F (36.8 °C), temperature source Tympanic, resp. rate 16, height 172.7 cm (68\"), weight 67.3 kg (148 lb 6.4 oz), SpO2 97%.  Physical Exam  Vitals and nursing note reviewed.   Constitutional:       Appearance: Normal appearance.   HENT:      Head: Normocephalic and atraumatic.      Nose: Nose normal.      Mouth/Throat:      Mouth: Mucous membranes are moist.      Pharynx: Oropharynx is clear.   Eyes:      Extraocular Movements: Extraocular movements intact.      Conjunctiva/sclera: Conjunctivae normal.      Pupils: Pupils are equal, round, " and reactive to light.   Cardiovascular:      Rate and Rhythm: Normal rate and regular rhythm.   Pulmonary:      Effort: Pulmonary effort is normal.      Breath sounds: Normal breath sounds.   Abdominal:      General: Abdomen is flat. Bowel sounds are normal.      Palpations: Abdomen is soft.   Musculoskeletal:         General: Normal range of motion.      Cervical back: Normal range of motion and neck supple.   Skin:     General: Skin is warm.      Capillary Refill: Capillary refill takes 2 to 3 seconds.   Neurological:      General: No focal deficit present.      Mental Status: He is alert and oriented to person, place, and time.   Psychiatric:         Mood and Affect: Mood normal.         Behavior: Behavior normal.       Immunization History   Administered Date(s) Administered    COVID-19 (MODERNA) 1st,2nd,3rd Dose Monovalent 04/17/2021, 05/18/2021    Hep B, Adolescent or Pediatric 07/26/1999    Hepatitis B Adolescent High Risk Infant 06/21/1999, 12/28/1999    Hepatitis B Adult/Adolescent IM 06/21/1999, 12/28/1999    MMR 06/21/1999    Td (TDVAX) 03/27/2003            Assessment & Plan     Diagnoses and all orders for this visit:    1. Chronic cough (Primary)  -     Complete PFT - Pre & Post Bronchodilator; Future  -     XR Chest 2 View; Future    2. Abnormal CT of the chest  -     Complete PFT - Pre & Post Bronchodilator; Future  -     XR Chest 2 View; Future         Result Review :       Data reviewed : Radiologic studies CT chest      Discussion/ Recommendations:   CT chest reviewed shows groundglass opacification left lower lobe likely sequelae of pneumonia  We will repeat a chest x-ray for follow-up  Will order PFTs for evaluation of his cough  Patient is advised to stop smoking  Discussed vaccination and recommended    BMI is within normal parameters. No other follow-up for BMI required.           Return in about 2 months (around 4/27/2024).      Thank you for allowing me to participate in the care of Albert  Rgahavendra Black. Please do not hesitate to contact me with any questions.         This document has been electronically signed by Rivera Elliott MD on February 27, 2024 12:59 EST

## 2024-04-19 ENCOUNTER — PROCEDURE VISIT (OUTPATIENT)
Dept: UROLOGY | Facility: CLINIC | Age: 34
End: 2024-04-19
Payer: MEDICARE

## 2024-04-19 ENCOUNTER — TELEPHONE (OUTPATIENT)
Dept: GASTROENTEROLOGY | Facility: CLINIC | Age: 34
End: 2024-04-19
Payer: MEDICARE

## 2024-04-19 DIAGNOSIS — Z30.2 STERILIZATION: Primary | ICD-10-CM

## 2024-04-19 NOTE — TELEPHONE ENCOUNTER
Called patient and advised him that our office made an error and that our providers do not see for his referral diagnosis. I explained that I would have to cancel his appointment and explained that we have notified his PCP that he will need a new referral for general surgery. Patient verbalized understanding and was agreeable to have his PCP send in a new referral.   
I spoke with patient and advised him that there was an error made when his appointment was scheduled and advised him that Stefaniealyse Ro does not see for rectal prolapse. Patient was offered to see Adele John in [place]. Patient is scheduled on 9/24/2024 at 1pm. Patient verbalized understanding.       
Quality 431: Preventive Care And Screening: Unhealthy Alcohol Use - Screening: Patient screened for unhealthy alcohol use using a single question and scores less than 2 times per year
Quality 265: Biopsy Follow-Up: Biopsy results reviewed, communicated, tracked, and documented
Quality 130: Documentation Of Current Medications In The Medical Record: Current Medications Documented
Quality 226: Preventive Care And Screening: Tobacco Use: Screening And Cessation Intervention: Patient screened for tobacco use and is an ex/non-smoker
Detail Level: Detailed

## 2024-04-19 NOTE — PROGRESS NOTES
Vasectomy Procedure    Procedure: Vasectomy     Pre-procedure Diagnosis: Undesired Fertility    Post-procedure Diagnosis: Same    Surgeon: Misha Truong MD    Anesthesia: Local, 10 cc of 1% Lidocaine    Indications  with undesired fertility, who presents today for vasectomy for permanent contraception.     Procedure Details:     The patient was appropriately identified, brought into the procedure room, and placed supine on the procedure table. A time was undertaken documenting the correct patient, site and procedure. His scrotum was prepped and draped in the standard sterile fashion. We first approached the right vas deferens. This was identified,  from the spermatic cord structures, and brought to the anterolateral aspect of the hemiscrotum. The local anaesthetic solution was injected and once an adequate level of local anesthesia was achieved, a scalpel was used to make a 1 cm incision in the skin overlying the vas deferens at the midline. A sharp hemostat was used to dissect the level of the vas deferens and on both of its sides, allowing for ring forceps to be introduced and grasp the vas deferens and externalize it through the skin incision. We then used a combination of sharp and blunt dissection to release the exposed vasal segment from all surrounding tissues. An approximately 1 cm piece of vas deferens was then sharply excised and removed. One blade of a sharp hemostat was used to probe each end of the severed vas deferens, confirming the presence of a vasal lumen. Electrocautery was then used to fulgurate both cut surfaces of the severed vas deferens. The abdominal side of the vas deferens was then placed underneath some perivasal tissues that were closed above it, using a figure-of-eight 3-0 chromic stitch, thus placing the two edges of the severed vas deferens in different tissue planes. Hemostasis was confirmed and the severed vas deferens was allowed to drop back into the scrotum.  We then  turned our attention to the left vas deferens. This was also identified and brought under the same midline incision. Local anesthetic was then delivered on this side around the vas deferens. An identical procedure was then carried out on the left vas deferens. Wound was dressed with bacitracin ointment and folded 4x4 gauze. The patient tolerated the procedure well and there were no intraoperative or immediate postoperative complications.     No intraprocedural complications    Blood loss 000    Plan:    1. Continue contraception until negative sperm analysis. Semen count in 10 weeks  2. Warning signs of infection were reviewed.   3. Patient is taken home by  with written home care instructions.  Bedrest X 48 hrs, Ice pack every 3 hours for 24 hrs.    Call the clinic if excessive pain, bleeding or swelling.  Light duty for 2 weeks    Patient voiced understanding.    Electronically Signed by Misha Truong MD on 04/19/2024

## 2024-04-22 DIAGNOSIS — K92.1 BLOOD IN STOOL: ICD-10-CM

## 2024-04-22 DIAGNOSIS — K62.3 RECTAL PROLAPSE: Primary | ICD-10-CM

## 2024-05-06 ENCOUNTER — OFFICE VISIT (OUTPATIENT)
Dept: FAMILY MEDICINE CLINIC | Age: 34
End: 2024-05-06
Payer: MEDICARE

## 2024-05-06 ENCOUNTER — LAB (OUTPATIENT)
Dept: LAB | Facility: HOSPITAL | Age: 34
End: 2024-05-06
Payer: MEDICARE

## 2024-05-06 VITALS
SYSTOLIC BLOOD PRESSURE: 135 MMHG | WEIGHT: 146.6 LBS | DIASTOLIC BLOOD PRESSURE: 80 MMHG | HEIGHT: 68 IN | HEART RATE: 59 BPM | BODY MASS INDEX: 22.22 KG/M2 | OXYGEN SATURATION: 97 % | TEMPERATURE: 98.2 F

## 2024-05-06 DIAGNOSIS — Z11.59 ENCOUNTER FOR SCREENING FOR VIRAL DISEASE: ICD-10-CM

## 2024-05-06 DIAGNOSIS — Z00.00 ROUTINE GENERAL MEDICAL EXAMINATION AT A HEALTH CARE FACILITY: ICD-10-CM

## 2024-05-06 DIAGNOSIS — K21.9 GASTROESOPHAGEAL REFLUX DISEASE WITHOUT ESOPHAGITIS: ICD-10-CM

## 2024-05-06 DIAGNOSIS — E78.2 MIXED HYPERLIPIDEMIA: ICD-10-CM

## 2024-05-06 DIAGNOSIS — Z87.891 PERSONAL HISTORY OF TOBACCO USE, PRESENTING HAZARDS TO HEALTH: ICD-10-CM

## 2024-05-06 DIAGNOSIS — F20.0 PARANOID SCHIZOPHRENIA: Primary | ICD-10-CM

## 2024-05-06 PROBLEM — Z30.2 STERILIZATION: Status: RESOLVED | Noted: 2024-04-19 | Resolved: 2024-05-06

## 2024-05-06 PROBLEM — Z30.09 STERILIZATION CONSULT: Status: RESOLVED | Noted: 2024-01-29 | Resolved: 2024-05-06

## 2024-05-06 PROCEDURE — 80061 LIPID PANEL: CPT

## 2024-05-06 PROCEDURE — 86803 HEPATITIS C AB TEST: CPT

## 2024-05-06 PROCEDURE — 80053 COMPREHEN METABOLIC PANEL: CPT

## 2024-05-06 PROCEDURE — 36415 COLL VENOUS BLD VENIPUNCTURE: CPT

## 2024-05-06 RX ORDER — BUSPIRONE HYDROCHLORIDE 15 MG/1
1 TABLET ORAL 3 TIMES DAILY
COMMUNITY
Start: 2024-04-25

## 2024-05-06 RX ORDER — BUPROPION HYDROCHLORIDE 150 MG/1
150 TABLET ORAL DAILY
COMMUNITY

## 2024-05-06 NOTE — PROGRESS NOTES
The ABCs of the Annual Wellness Visit  Subsequent Medicare Wellness Visit    Subjective    Albert Black is a 34 y.o. male who presents for a Subsequent Medicare Wellness Visit.    The following portions of the patient's history were reviewed and   updated as appropriate: allergies, current medications, past family history, past medical history, past social history, past surgical history, and problem list.    Compared to one year ago, the patient feels his physical   health is better.    Compared to one year ago, the patient feels his mental   health is better.    Recent Hospitalizations:  He was not admitted to the hospital during the last year.       Current Medical Providers:  Patient Care Team:  Christiana Mtz APRN as PCP - General (Nurse Practitioner)    Outpatient Medications Prior to Visit   Medication Sig Dispense Refill    buPROPion XL (WELLBUTRIN XL) 150 MG 24 hr tablet Take 1 tablet by mouth Daily.      busPIRone (BUSPAR) 15 MG tablet Take 1 tablet by mouth 3 times a day.      FLUoxetine (PROzac) 20 MG capsule Take 4 capsules by mouth Daily.      folic acid (FOLVITE) 1 MG tablet Take 1 tablet by mouth Daily.      hydrOXYzine (ATARAX) 50 MG tablet Take 1 tablet by mouth As Needed for Itching.      naltrexone (DEPADE) 50 MG tablet Take 1 tablet by mouth Daily.      OLANZapine (zyPREXA) 10 MG tablet Take 1 tablet by mouth Daily.      omeprazole (priLOSEC) 20 MG capsule Take 1 capsule by mouth Daily.      traZODone (DESYREL) 100 MG tablet Take 0.5-1 tablets by mouth At Night As Needed.      buPROPion SR (WELLBUTRIN SR) 150 MG 12 hr tablet Take 1 tablet by mouth 2 (Two) Times a Day. (Patient not taking: Reported on 5/6/2024)      busPIRone (BUSPAR) 7.5 MG tablet Take 2 tablets by mouth 3 (Three) Times a Day.      diazePAM (VALIUM) 5 MG tablet Take 1 tablet by mouth. (Patient not taking: Reported on 5/6/2024)      predniSONE (DELTASONE) 20 MG tablet Take 2 tablets by mouth Daily. (Patient not taking:  "Reported on 5/6/2024) 10 tablet 0     No facility-administered medications prior to visit.       No opioid medication identified on active medication list. I have reviewed chart for other potential  high risk medication/s and harmful drug interactions in the elderly.        Aspirin is not on active medication list.  Aspirin use is not indicated based on review of current medical condition/s. Risk of harm outweighs potential benefits.  .    Patient Active Problem List   Diagnosis    Primary insomnia    Gastroesophageal reflux disease without esophagitis    Folate deficiency    Mixed hyperlipidemia    SVT (supraventricular tachycardia)    S/P atrioventricular lauren ablation    Paranoid schizophrenia    Sleep apnea    Rectal bleed    Personal history of tobacco use, presenting hazards to health     Advance Care Planning   Advance Care Planning     Advance Directive is not on file.  ACP discussion was held with the patient during this visit. Patient does not have an advance directive, declines further assistance.     Objective    Vitals:    05/06/24 1100   BP: 135/80   BP Location: Left arm   Patient Position: Sitting   Pulse: 59   Temp: 98.2 °F (36.8 °C)   TempSrc: Oral   SpO2: 97%   Weight: 66.5 kg (146 lb 9.6 oz)   Height: 172.7 cm (67.99\")   PainSc: 0-No pain     Estimated body mass index is 22.3 kg/m² as calculated from the following:    Height as of this encounter: 172.7 cm (67.99\").    Weight as of this encounter: 66.5 kg (146 lb 9.6 oz).    BMI is within normal parameters. No other follow-up for BMI required.      Does the patient have evidence of cognitive impairment? No    Lab Results   Component Value Date    TRIG 155 (H) 05/06/2024    HDL 42 05/06/2024     (H) 05/06/2024    VLDL 28 05/06/2024        HEALTH RISK ASSESSMENT    Smoking Status:  Social History     Tobacco Use   Smoking Status Every Day    Current packs/day: 0.25    Average packs/day: 0.3 packs/day for 17.9 years (4.5 ttl pk-yrs)    Types: " Cigarettes    Start date: 2006   Smokeless Tobacco Never   Tobacco Comments    Started age 15     Alcohol Consumption:  Social History     Substance and Sexual Activity   Alcohol Use Not Currently    Comment: quit 1 month ago (2023) was drinking 1/2 pint daily.     Fall Risk Screen:    TIERRA Fall Risk Assessment has not been completed.    Depression Screenin/6/2024    11:00 AM   PHQ-2/PHQ-9 Depression Screening   Little Interest or Pleasure in Doing Things 0-->not at all   Feeling Down, Depressed or Hopeless 0-->not at all   PHQ-9: Brief Depression Severity Measure Score 0       Health Habits and Functional and Cognitive Screenin/6/2024    11:34 AM   Functional & Cognitive Status   Do you have difficulty preparing food and eating? No   Do you have difficulty bathing yourself, getting dressed or grooming yourself? No   Do you have difficulty using the toilet? No   Do you have difficulty moving around from place to place? No   Do you have trouble with steps or getting out of a bed or a chair? No   Current Diet Unhealthy Diet   Dental Exam Other        Dental Exam Comment dentures   Eye Exam Not up to date   Exercise (times per week) 0 times per week   Current Exercises Include No Regular Exercise   Do you need help using the phone?  No   Are you deaf or do you have serious difficulty hearing?  No   Do you need help to go to places out of walking distance? No   Do you need help shopping? No   Do you need help preparing meals?  No   Do you need help with housework?  No   Do you need help with laundry? No   Do you need help taking your medications? No   Do you need help managing money? No   Do you ever drive or ride in a car without wearing a seat belt? No   Have you felt unusual stress, anger or loneliness in the last month? Yes   Who do you live with? Spouse   If you need help, do you have trouble finding someone available to you? No   Have you been bothered in the last four weeks by sexual  problems? No   Do you have difficulty concentrating, remembering or making decisions? No       Age-appropriate Screening Schedule:  Refer to the list below for future screening recommendations based on patient's age, sex and/or medical conditions. Orders for these recommended tests are listed in the plan section. The patient has been provided with a written plan.    Health Maintenance   Topic Date Due    Pneumococcal Vaccine 0-64 (1 of 2 - PCV) Never done    TDAP/TD VACCINES (2 - Tdap) 03/27/2013    COVID-19 Vaccine (3 - 2023-24 season) 05/18/2024 (Originally 9/1/2023)    INFLUENZA VACCINE  08/01/2024    ANNUAL WELLNESS VISIT  05/06/2025    LIPID PANEL  05/06/2025    HEPATITIS C SCREENING  Completed                  CMS Preventative Services Quick Reference  Risk Factors Identified During Encounter  Depression/Dysphoria: Current medication is effective, no change recommended  Immunizations Discussed/Encouraged: pneumonia, covid and tdap  Polypharmacy: Medication List reviewed and Medications are appropriate for patient  Dental Screening Recommended  Vision Screening Recommended  The above risks/problems have been discussed with the patient.  Pertinent information has been shared with the patient in the After Visit Summary.  An After Visit Summary and PPPS were made available to the patient.    Follow Up:   Next Medicare Wellness visit to be scheduled in 1 year.       Additional E&M Note during same encounter follows:  Patient has multiple medical problems which are significant and separately identifiable that require additional work above and beyond the Medicare Wellness Visit.      Chief Complaint  Medicare Wellness-subsequent    Subjective        HPI  Albert Black is also being seen today for routine visit. Ted Henning, at [facility] behavioral [facility].     GERD well under control with prilosec 20mg daily. Has appt with gastro soon.     Patient is taking naltrexone for opioid addiction in remission.      "    Objective   Vital Signs:  /80 (BP Location: Left arm, Patient Position: Sitting)   Pulse 59   Temp 98.2 °F (36.8 °C) (Oral)   Ht 172.7 cm (67.99\")   Wt 66.5 kg (146 lb 9.6 oz)   SpO2 97%   BMI 22.30 kg/m²     Physical Exam  Vitals reviewed.   Constitutional:       General: He is not in acute distress.     Appearance: Normal appearance. He is not diaphoretic.   HENT:      Head: Normocephalic and atraumatic. Hair is normal.      Right Ear: Hearing and external ear normal.      Left Ear: Hearing and external ear normal.      Nose: Nose normal. No nasal deformity.      Mouth/Throat:      Mouth: Mucous membranes are moist.   Eyes:      General: Lids are normal. No scleral icterus.        Right eye: No discharge.         Left eye: No discharge.      Extraocular Movements: Extraocular movements intact.      Right eye: Normal extraocular motion and no nystagmus.      Left eye: Normal extraocular motion and no nystagmus.      Conjunctiva/sclera: Conjunctivae normal.      Pupils: Pupils are equal, round, and reactive to light.   Neck:      Thyroid: No thyromegaly.   Cardiovascular:      Rate and Rhythm: Normal rate and regular rhythm.      Pulses: Normal pulses.      Heart sounds: Normal heart sounds. No murmur heard.     No gallop.   Pulmonary:      Effort: Pulmonary effort is normal. No respiratory distress.      Breath sounds: Normal breath sounds. No wheezing or rales.   Chest:      Chest wall: No tenderness.   Abdominal:      General: Bowel sounds are normal. There is no distension.      Palpations: Abdomen is soft. There is no mass.      Tenderness: There is no abdominal tenderness. There is no guarding.      Hernia: No hernia is present.   Musculoskeletal:         General: No tenderness or deformity. Normal range of motion.      Cervical back: Normal range of motion and neck supple.   Lymphadenopathy:      Cervical: No cervical adenopathy.   Skin:     General: Skin is warm and dry.      Findings: No " rash.   Neurological:      General: No focal deficit present.      Mental Status: He is alert and oriented to person, place, and time.      Coordination: Coordination normal.   Psychiatric:         Mood and Affect: Mood normal.         Behavior: Behavior normal.         Thought Content: Thought content normal.         Judgment: Judgment normal.                         Assessment and Plan   Diagnoses and all orders for this visit:    1. Paranoid schizophrenia (Primary)    2. Mixed hyperlipidemia  Comments:  Will notify patient of results and treat accordingly.  Heart healthy diet    3. Gastroesophageal reflux disease without esophagitis  Comments:  Controlled.  Continue Prilosec 20 mg daily.    4. Routine general medical examination at a health care facility  Comments:  Declines COVID, pneumonia and tetanus vaccines today.  Counseled on routine dental and eye exams.  Due for routine blood work.  Orders:  -     Comprehensive Metabolic Panel; Future  -     Lipid Panel; Future  -     Comprehensive Metabolic Panel; Future  -     Lipid Panel; Future    5. Encounter for screening for viral disease  Comments:  Will notify patient of results and treat accordingly.  Orders:  -     Hepatitis C Antibody; Future    6. Personal history of tobacco use, presenting hazards to health  Assessment & Plan:  Patient strongly encouraged to consider tobacco cessation. Potential health complications discussed. Patient is aware. Patient is not interested in cessation at this time. Encouraged patient to reach out to the office when they are ready to quit.       Patient to notify office with any acute concerns or issues.  Patient verbalizes understanding, agrees with plan of care and has no further questions upon discharge.    Please note that portions of this note were completed with a voice recognition program.  Lab orders placed for next routine follow up.            Follow Up   Return in about 1 year (around 5/6/2025) for Annual  physical.  Patient was given instructions and counseling regarding his condition or for health maintenance advice. Please see specific information pulled into the AVS if appropriate.

## 2024-05-06 NOTE — PROGRESS NOTES
"Chief Complaint  Annual Exam    Subjective     {Problem List  Visit Diagnosis   Encounters  Notes  Medications  Labs  Result Review Imaging  Media :23}     Albert Black presents to [facility]    Review of Systems   Constitutional:  Negative for fatigue.   HENT:  Negative for hearing loss and trouble swallowing.    Respiratory:  Negative for cough and shortness of breath.    Cardiovascular:  Negative for chest pain, palpitations and leg swelling.   Gastrointestinal:  Negative for abdominal pain, constipation, diarrhea, nausea and vomiting.   Genitourinary:  Negative for difficulty urinating.   Musculoskeletal:  Negative for arthralgias and myalgias.   Skin:  Negative for rash.   Neurological:  Negative for headaches.   Psychiatric/Behavioral:  Negative for dysphoric mood, sleep disturbance and suicidal ideas. The patient is not nervous/anxious.         Objective   Vital Signs:   Vitals:    05/06/24 1100   BP: 135/80   BP Location: Left arm   Patient Position: Sitting   Pulse: 59   Temp: 98.2 °F (36.8 °C)   TempSrc: Oral   SpO2: 97%   Weight: 66.5 kg (146 lb 9.6 oz)   Height: 172.7 cm (67.99\")       Body mass index is 22.3 kg/m².   BMI is within normal parameters. No other follow-up for BMI required.    Physical Exam  Vitals reviewed.   Constitutional:       General: He is not in acute distress.     Appearance: Normal appearance. He is not diaphoretic.   HENT:      Head: Normocephalic and atraumatic. Hair is normal.      Right Ear: Hearing and external ear normal.      Left Ear: Hearing and external ear normal.      Nose: Nose normal. No nasal deformity.      Mouth/Throat:      Mouth: Mucous membranes are moist.   Eyes:      General: Lids are normal. No scleral icterus.        Right eye: No discharge.         Left eye: No discharge.      Extraocular Movements: Extraocular movements intact.      Right eye: Normal extraocular motion and no nystagmus.      Left eye: Normal " "extraocular motion and no nystagmus.      Conjunctiva/sclera: Conjunctivae normal.      Pupils: Pupils are equal, round, and reactive to light.   Neck:      Thyroid: No thyromegaly.   Cardiovascular:      Rate and Rhythm: Normal rate and regular rhythm.      Pulses: Normal pulses.      Heart sounds: Normal heart sounds. No murmur heard.     No gallop.   Pulmonary:      Effort: Pulmonary effort is normal. No respiratory distress.      Breath sounds: Normal breath sounds. No wheezing or rales.   Chest:      Chest wall: No tenderness.   Abdominal:      General: Bowel sounds are normal. There is no distension.      Palpations: Abdomen is soft. There is no mass.      Tenderness: There is no abdominal tenderness. There is no guarding.      Hernia: No hernia is present.   Musculoskeletal:         General: No tenderness or deformity. Normal range of motion.      Cervical back: Normal range of motion and neck supple.   Lymphadenopathy:      Cervical: No cervical adenopathy.   Skin:     General: Skin is warm and dry.      Findings: No rash.   Neurological:      General: No focal deficit present.      Mental Status: He is alert and oriented to person, place, and time.      Coordination: Coordination normal.   Psychiatric:         Mood and Affect: Mood normal.         Behavior: Behavior normal.         Thought Content: Thought content normal.         Judgment: Judgment normal.        {DIABETIC FOOT EXAM FOR  (Optional):70311::\"Diabetic Foot Exam Performed\":0}    Lab Results   Component Value Date    GLUCOSE 102 (H) 10/30/2023    BUN 6 10/30/2023    CREATININE 1.27 10/30/2023    EGFR 75.6 10/30/2023    BCR 4.7 (L) 10/30/2023    K 4.4 10/30/2023    CO2 29.0 10/30/2023    CALCIUM 10.1 10/30/2023    ALBUMIN 4.5 10/30/2023    BILITOT 0.3 10/30/2023    AST 13 10/30/2023    ALT 22 10/30/2023     Lab Results   Component Value Date    HGBA1C 5.4 03/05/2021     Lab Results   Component Value Date    WBC 6.43 10/30/2023    HGB 15.7 " 10/30/2023    HCT 48.8 10/30/2023    MCV 88.2 10/30/2023     10/30/2023     Lab Results   Component Value Date    CHOL 195 10/30/2023    CHOL 211 (H) 07/06/2021    CHLPL 235 (H) 03/05/2021     Lab Results   Component Value Date    TRIG 299 (H) 10/30/2023    TRIG 367 (H) 07/06/2021    TRIG 447 (H) 03/05/2021     Lab Results   Component Value Date    HDL 35 (L) 10/30/2023    HDL 43 07/06/2021    HDL 37 (L) 03/05/2021     Lab Results   Component Value Date     (H) 10/30/2023     (H) 07/06/2021     (H) 03/05/2021     Lab Results   Component Value Date    TSH 1.870 10/30/2023                  Assessment and Plan {WrapupProblem List  Visit Diagnosis  ROS  Review (Popup)  Health Maintenance  Quality  BestPractice  Medications  SmartSets  SnapShot Encounters  Media :23}   Assessment & Plan  Sleep apnea, unspecified type    Paranoid schizophrenia  {Psychiatric illness (Optional):61392}  Mixed hyperlipidemia   {Hyperlipidemia A/P Block (Optional):4634185746}  Gastroesophageal reflux disease without esophagitis    Routine general medical examination at a health care facility    Encounter for screening for viral disease    Personal history of tobacco use, presenting hazards to health      Orders Placed This Encounter   Procedures    Comprehensive Metabolic Panel    Lipid Panel    Hepatitis C Antibody            Patient to notify office with any acute concerns or issues.  Patient verbalizes understanding, agrees with plan of care and has no further questions upon discharge.    Please note that portions of this note were completed with a voice recognition program.    {Time Spent (Optional):75963}  Follow Up {  Wrap  LOS  Follow-up  Instructions  Communications :23}   Return in about 1 year (around 5/6/2025) for Annual physical.  Patient was given instructions and counseling regarding his condition or for health maintenance advice. Please see specific information pulled into the AVS if  appropriate.   Medications Discontinued During This Encounter   Medication Reason    busPIRone (BUSPAR) 7.5 MG tablet *Therapy completed    buPROPion SR (WELLBUTRIN SR) 150 MG 12 hr tablet Historical Med - Therapy completed    predniSONE (DELTASONE) 20 MG tablet Historical Med - Therapy completed    diazePAM (VALIUM) 5 MG tablet Historical Med - Therapy completed

## 2024-05-07 LAB
ALBUMIN SERPL-MCNC: 4.6 G/DL (ref 3.5–5.2)
ALBUMIN/GLOB SERPL: 2 G/DL
ALP SERPL-CCNC: 90 U/L (ref 39–117)
ALT SERPL W P-5'-P-CCNC: 32 U/L (ref 1–41)
ANION GAP SERPL CALCULATED.3IONS-SCNC: 6 MMOL/L (ref 5–15)
AST SERPL-CCNC: 17 U/L (ref 1–40)
BILIRUB SERPL-MCNC: 0.3 MG/DL (ref 0–1.2)
BUN SERPL-MCNC: 7 MG/DL (ref 6–20)
BUN/CREAT SERPL: 5.9 (ref 7–25)
CALCIUM SPEC-SCNC: 9.6 MG/DL (ref 8.6–10.5)
CHLORIDE SERPL-SCNC: 103 MMOL/L (ref 98–107)
CHOLEST SERPL-MCNC: 197 MG/DL (ref 0–200)
CO2 SERPL-SCNC: 30 MMOL/L (ref 22–29)
CREAT SERPL-MCNC: 1.19 MG/DL (ref 0.76–1.27)
EGFRCR SERPLBLD CKD-EPI 2021: 81.2 ML/MIN/1.73
GLOBULIN UR ELPH-MCNC: 2.3 GM/DL
GLUCOSE SERPL-MCNC: 99 MG/DL (ref 65–99)
HCV AB SER QL: NORMAL
HDLC SERPL-MCNC: 42 MG/DL (ref 40–60)
LDLC SERPL CALC-MCNC: 127 MG/DL (ref 0–100)
LDLC/HDLC SERPL: 2.95 {RATIO}
POTASSIUM SERPL-SCNC: 4.6 MMOL/L (ref 3.5–5.2)
PROT SERPL-MCNC: 6.9 G/DL (ref 6–8.5)
SODIUM SERPL-SCNC: 139 MMOL/L (ref 136–145)
TRIGL SERPL-MCNC: 155 MG/DL (ref 0–150)
VLDLC SERPL-MCNC: 28 MG/DL (ref 5–40)

## 2024-05-08 ENCOUNTER — OFFICE VISIT (OUTPATIENT)
Dept: SURGERY | Facility: CLINIC | Age: 34
End: 2024-05-08
Payer: MEDICARE

## 2024-05-08 ENCOUNTER — PREP FOR SURGERY (OUTPATIENT)
Dept: OTHER | Facility: HOSPITAL | Age: 34
End: 2024-05-08
Payer: MEDICARE

## 2024-05-08 VITALS
SYSTOLIC BLOOD PRESSURE: 119 MMHG | HEART RATE: 76 BPM | WEIGHT: 147 LBS | HEIGHT: 68 IN | BODY MASS INDEX: 22.28 KG/M2 | DIASTOLIC BLOOD PRESSURE: 79 MMHG

## 2024-05-08 DIAGNOSIS — K62.5 RECTAL BLEED: Primary | ICD-10-CM

## 2024-05-08 DIAGNOSIS — K62.5 RECTAL BLEEDING: Primary | ICD-10-CM

## 2024-05-08 RX ORDER — SODIUM, POTASSIUM,MAG SULFATES 17.5-3.13G
SOLUTION, RECONSTITUTED, ORAL ORAL
Qty: 177 ML | Refills: 0 | Status: SHIPPED | OUTPATIENT
Start: 2024-05-08

## 2024-05-17 PROBLEM — Z87.891 PERSONAL HISTORY OF TOBACCO USE, PRESENTING HAZARDS TO HEALTH: Status: ACTIVE | Noted: 2024-05-17

## 2024-05-17 NOTE — ASSESSMENT & PLAN NOTE
Patient strongly encouraged to consider tobacco cessation. Potential health complications discussed. Patient is aware. Patient is not interested in cessation at this time. Encouraged patient to reach out to the office when they are ready to quit.

## 2024-05-22 ENCOUNTER — HOSPITAL ENCOUNTER (OUTPATIENT)
Dept: RESPIRATORY THERAPY | Facility: HOSPITAL | Age: 34
Discharge: HOME | End: 2024-05-22
Admitting: INTERNAL MEDICINE
Payer: MEDICARE

## 2024-05-22 DIAGNOSIS — R05.3 CHRONIC COUGH: ICD-10-CM

## 2024-05-22 DIAGNOSIS — R93.89 ABNORMAL CT OF THE CHEST: ICD-10-CM

## 2024-05-22 PROCEDURE — 94729 DIFFUSING CAPACITY: CPT

## 2024-05-22 PROCEDURE — 94060 EVALUATION OF WHEEZING: CPT

## 2024-05-22 PROCEDURE — 94726 PLETHYSMOGRAPHY LUNG VOLUMES: CPT

## 2024-05-22 RX ORDER — ALBUTEROL SULFATE 2.5 MG/3ML
2.5 SOLUTION RESPIRATORY (INHALATION) ONCE
Status: COMPLETED | OUTPATIENT
Start: 2024-05-22 | End: 2024-05-22

## 2024-05-22 RX ADMIN — ALBUTEROL SULFATE 2.5 MG: 2.5 SOLUTION RESPIRATORY (INHALATION) at 12:49

## 2024-06-04 ENCOUNTER — TELEPHONE (OUTPATIENT)
Dept: SURGERY | Facility: CLINIC | Age: 34
End: 2024-06-04
Payer: MEDICARE

## 2024-06-04 NOTE — TELEPHONE ENCOUNTER
I called patient to reschedule colonoscopy on 06-27-24.     Patient states that he is actually wanting to move it to [facility] in [place]. Please advise.

## 2024-06-04 NOTE — TELEPHONE ENCOUNTER
PATIENT IS REQUESTING FOR THIS TO BE PERFORMED AT [facility]. PER PROVIDER, PATIENT WILL NEED TO CALL HIS PCP OR PHYSICIANS AT [facility] TO ARRANGE.    PATIENT INFORMED AND VOICED UNDERSTANDING.

## 2024-06-04 NOTE — TELEPHONE ENCOUNTER
"Hub staff attempted to follow warm transfer process and was unsuccessful     Caller: Albert Black \"Catracho\"    Relationship to patient: Self    Best call back number: [phone]     Patient is needing: NEEDS TO R/S [facility] W/ DR JALEEL BAKER FOR 06/27/24  "

## 2024-06-10 ENCOUNTER — OFFICE VISIT (OUTPATIENT)
Dept: PULMONOLOGY | Facility: CLINIC | Age: 34
End: 2024-06-10
Payer: MEDICARE

## 2024-06-10 VITALS
TEMPERATURE: 98.4 F | HEART RATE: 75 BPM | HEIGHT: 68 IN | SYSTOLIC BLOOD PRESSURE: 121 MMHG | WEIGHT: 148.4 LBS | RESPIRATION RATE: 16 BRPM | DIASTOLIC BLOOD PRESSURE: 75 MMHG | OXYGEN SATURATION: 96 % | BODY MASS INDEX: 22.49 KG/M2

## 2024-06-10 DIAGNOSIS — R93.89 ABNORMAL CT OF THE CHEST: ICD-10-CM

## 2024-06-10 DIAGNOSIS — R05.3 CHRONIC COUGH: Primary | ICD-10-CM

## 2024-06-10 PROCEDURE — 1159F MED LIST DOCD IN RCRD: CPT | Performed by: INTERNAL MEDICINE

## 2024-06-10 PROCEDURE — 99213 OFFICE O/P EST LOW 20 MIN: CPT | Performed by: INTERNAL MEDICINE

## 2024-06-10 PROCEDURE — 1160F RVW MEDS BY RX/DR IN RCRD: CPT | Performed by: INTERNAL MEDICINE

## 2024-06-10 NOTE — PROGRESS NOTES
Pulmonary Office Follow-up    Subjective     Albert Black is seen today at the office for   Chief Complaint   Patient presents with    Follow-up     2 month    tobacco use    Cough         HPI  Albert Black is a 34 y.o. male with a PMH significant for chronic cough with history of pneumonia presents for follow-up  Patient denies any cough shortness of breath or expectoration he is cutting back on his smoking      Tobacco use history:        Patient Active Problem List   Diagnosis    Primary insomnia    Gastroesophageal reflux disease without esophagitis    Folate deficiency    Mixed hyperlipidemia    SVT (supraventricular tachycardia)    S/P atrioventricular lauren ablation    Paranoid schizophrenia    Sleep apnea    Rectal bleed    Personal history of tobacco use, presenting hazards to health       Review of Systems  Review of Systems   All other systems reviewed and are negative.    As described in the HPI. Otherwise, remainder of ROS (14 systems) were negative.    Medications, Allergies, Social, and Family Histories reviewed as per EMR.    Result Review :            Objective     Vitals:    06/10/24 0913   BP: 121/75   Pulse: 75   Resp: 16   Temp: 98.4 °F (36.9 °C)   SpO2: 96%         06/10/24  0913   Weight: 67.3 kg (148 lb 6.4 oz)       Physical Exam  Vitals and nursing note reviewed.   Constitutional:       Appearance: Normal appearance.   HENT:      Head: Normocephalic and atraumatic.      Nose: Nose normal.      Mouth/Throat:      Mouth: Mucous membranes are moist.      Pharynx: Oropharynx is clear.   Eyes:      Extraocular Movements: Extraocular movements intact.      Conjunctiva/sclera: Conjunctivae normal.      Pupils: Pupils are equal, round, and reactive to light.   Cardiovascular:      Rate and Rhythm: Normal rate and regular rhythm.      Pulses: Normal pulses.      Heart sounds: Normal heart sounds.   Pulmonary:      Effort: Pulmonary effort is normal.      Breath sounds: Normal breath  sounds.   Abdominal:      General: Abdomen is flat. Bowel sounds are normal.      Palpations: Abdomen is soft.   Musculoskeletal:         General: Normal range of motion.      Cervical back: Normal range of motion and neck supple.   Skin:     General: Skin is warm.      Capillary Refill: Capillary refill takes 2 to 3 seconds.   Neurological:      General: No focal deficit present.      Mental Status: He is alert and oriented to person, place, and time.   Psychiatric:         Mood and Affect: Mood normal.         Behavior: Behavior normal.         No radiology results for the last 90 days.     Assessment & Plan     Diagnoses and all orders for this visit:    1. Chronic cough (Primary)    2. Abnormal CT of the chest         Discussion/ Recommendations:   PFT's were reviewed which were within normal limits  Repeat chest x-ray does not reveal any abnormality  Patient is advised to stop smoking  Continue home medications  Vaccinations discussed and recommended    BMI is within normal parameters. No other follow-up for BMI required.        Return in about 1 year (around 6/10/2025).          This document has been electronically signed by Rivera Elliott MD on Connie 10, 2024 09:18 EDT

## 2024-06-28 ENCOUNTER — TELEPHONE (OUTPATIENT)
Dept: UROLOGY | Facility: CLINIC | Age: 34
End: 2024-06-28

## 2024-06-28 ENCOUNTER — OFFICE VISIT (OUTPATIENT)
Dept: UROLOGY | Facility: CLINIC | Age: 34
End: 2024-06-28
Payer: MEDICARE

## 2024-06-28 DIAGNOSIS — Z30.2 STERILIZATION: Primary | ICD-10-CM

## 2024-06-28 PROCEDURE — 1159F MED LIST DOCD IN RCRD: CPT | Performed by: UROLOGY

## 2024-06-28 PROCEDURE — 1160F RVW MEDS BY RX/DR IN RCRD: CPT | Performed by: UROLOGY

## 2024-06-28 PROCEDURE — 99024 POSTOP FOLLOW-UP VISIT: CPT | Performed by: UROLOGY

## 2024-06-28 NOTE — PROGRESS NOTES
Subjective         History of Present Illness:  Albert Black is a 34 y.o. male who is here status post vasectomy. 0 sperm noted on a #20 power fields.         Assessment and Plan     Undesired fertility    Medications  Medications have been reconciled.       Instructions    [x]   Instructed patient to follow-up as needed, counseled that he is okay to stop using birth control.    []   Patient to follow-up in 1 month for recheck, patient counseled to continue use birth control as he is still considered fertile and able to father children until recheck and given the okay to stop using birth control at that time. Patient voiced understanding.         Misha Truong MD

## 2024-06-28 NOTE — TELEPHONE ENCOUNTER
Informed pt that specimen is negative, zero sperm noted and pt can continue with no birth control.

## 2025-05-06 ENCOUNTER — TELEPHONE (OUTPATIENT)
Dept: FAMILY MEDICINE CLINIC | Age: 35
End: 2025-05-06
Payer: MEDICARE

## 2025-05-09 ENCOUNTER — LAB (OUTPATIENT)
Dept: LAB | Facility: HOSPITAL | Age: 35
End: 2025-05-09
Payer: MEDICARE

## 2025-05-09 DIAGNOSIS — Z00.00 ROUTINE GENERAL MEDICAL EXAMINATION AT A HEALTH CARE FACILITY: ICD-10-CM

## 2025-05-09 LAB
ALBUMIN SERPL-MCNC: 4.3 G/DL (ref 3.5–5.2)
ALBUMIN/GLOB SERPL: 2 G/DL
ALP SERPL-CCNC: 77 U/L (ref 39–117)
ALT SERPL W P-5'-P-CCNC: 28 U/L (ref 1–41)
ANION GAP SERPL CALCULATED.3IONS-SCNC: 9 MMOL/L (ref 5–15)
AST SERPL-CCNC: 20 U/L (ref 1–40)
BILIRUB SERPL-MCNC: 0.3 MG/DL (ref 0–1.2)
BUN SERPL-MCNC: 10 MG/DL (ref 6–20)
BUN/CREAT SERPL: 8.4 (ref 7–25)
CALCIUM SPEC-SCNC: 8.8 MG/DL (ref 8.6–10.5)
CHLORIDE SERPL-SCNC: 101 MMOL/L (ref 98–107)
CHOLEST SERPL-MCNC: 167 MG/DL (ref 0–200)
CO2 SERPL-SCNC: 28 MMOL/L (ref 22–29)
CREAT SERPL-MCNC: 1.19 MG/DL (ref 0.76–1.27)
EGFRCR SERPLBLD CKD-EPI 2021: 80.7 ML/MIN/1.73
GLOBULIN UR ELPH-MCNC: 2.1 GM/DL
GLUCOSE SERPL-MCNC: 97 MG/DL (ref 65–99)
HDLC SERPL-MCNC: 52 MG/DL (ref 40–60)
LDLC SERPL CALC-MCNC: 91 MG/DL (ref 0–100)
LDLC/HDLC SERPL: 1.69 {RATIO}
POTASSIUM SERPL-SCNC: 4 MMOL/L (ref 3.5–5.2)
PROT SERPL-MCNC: 6.4 G/DL (ref 6–8.5)
SODIUM SERPL-SCNC: 138 MMOL/L (ref 136–145)
TRIGL SERPL-MCNC: 135 MG/DL (ref 0–150)
VLDLC SERPL-MCNC: 24 MG/DL (ref 5–40)

## 2025-05-09 PROCEDURE — 80061 LIPID PANEL: CPT

## 2025-05-09 PROCEDURE — 80053 COMPREHEN METABOLIC PANEL: CPT

## 2025-05-09 PROCEDURE — 36415 COLL VENOUS BLD VENIPUNCTURE: CPT

## 2025-05-12 ENCOUNTER — OFFICE VISIT (OUTPATIENT)
Dept: FAMILY MEDICINE CLINIC | Age: 35
End: 2025-05-12
Payer: MEDICARE

## 2025-05-12 VITALS
DIASTOLIC BLOOD PRESSURE: 85 MMHG | HEART RATE: 65 BPM | TEMPERATURE: 98.5 F | SYSTOLIC BLOOD PRESSURE: 125 MMHG | HEIGHT: 69 IN | BODY MASS INDEX: 21.92 KG/M2 | WEIGHT: 148 LBS | OXYGEN SATURATION: 99 %

## 2025-05-12 DIAGNOSIS — Z87.891 PERSONAL HISTORY OF TOBACCO USE, PRESENTING HAZARDS TO HEALTH: Primary | ICD-10-CM

## 2025-05-12 DIAGNOSIS — Z13.6 ENCOUNTER FOR SCREENING FOR CARDIOVASCULAR DISORDERS: ICD-10-CM

## 2025-05-12 DIAGNOSIS — K21.9 GASTROESOPHAGEAL REFLUX DISEASE WITHOUT ESOPHAGITIS: ICD-10-CM

## 2025-05-12 PROBLEM — Z30.2 ENCOUNTER FOR STERILIZATION: Status: RESOLVED | Noted: 2024-06-28 | Resolved: 2025-05-12

## 2025-05-12 PROBLEM — K62.5 RECTAL BLEED: Status: RESOLVED | Noted: 2024-05-08 | Resolved: 2025-05-12

## 2025-05-12 PROBLEM — E78.2 MIXED HYPERLIPIDEMIA: Status: RESOLVED | Noted: 2021-07-06 | Resolved: 2025-05-12

## 2025-05-12 RX ORDER — OLANZAPINE 15 MG/1
15 TABLET, FILM COATED ORAL
COMMUNITY
Start: 2025-04-22

## 2025-05-12 RX ORDER — OMEPRAZOLE 20 MG/1
20 CAPSULE, DELAYED RELEASE ORAL DAILY
Qty: 90 CAPSULE | Refills: 3 | Status: SHIPPED | OUTPATIENT
Start: 2025-05-12

## 2025-05-12 NOTE — PROGRESS NOTES
Subjective   The ABCs of the Annual Wellness Visit  Medicare Wellness Visit      Albert Black is a 35 y.o. patient who presents for a Medicare Wellness Visit.    The following portions of the patient's history were reviewed and   updated as appropriate: allergies, current medications, past family history, past medical history, past social history, past surgical history, and problem list.    Compared to one year ago, the patient's physical   health is the same.  Compared to one year ago, the patient's mental   health is the same.    Recent Hospitalizations:  He was not admitted to the hospital during the last year.     Current Medical Providers:  Patient Care Team:  Christiana Mtz APRN as PCP - General (Nurse Practitioner)  Rivera Elliott MD as Consulting Physician (Pulmonary Disease)    Outpatient Medications Prior to Visit   Medication Sig Dispense Refill    buPROPion XL (WELLBUTRIN XL) 150 MG 24 hr tablet Take 1 tablet by mouth Daily.      busPIRone (BUSPAR) 15 MG tablet Take 1 tablet by mouth 3 times a day.      FLUoxetine (PROzac) 20 MG capsule Take 3 capsules by mouth Daily.      folic acid (FOLVITE) 1 MG tablet Take 1 tablet by mouth Daily.      hydrOXYzine (ATARAX) 50 MG tablet Take 1 tablet by mouth As Needed for Itching.      naltrexone (DEPADE) 50 MG tablet Take 1 tablet by mouth Daily.      OLANZapine (zyPREXA) 15 MG tablet Take 1 tablet by mouth every night at bedtime.      traZODone (DESYREL) 100 MG tablet Take 0.5-1 tablets by mouth At Night As Needed.      omeprazole (priLOSEC) 20 MG capsule Take 1 capsule by mouth Daily.      FLUoxetine (PROzac) 40 MG capsule Take 2 capsules by mouth Every Morning. (Patient not taking: Reported on 5/12/2025)      OLANZapine (zyPREXA) 10 MG tablet Take 1 tablet by mouth Daily. (Patient not taking: Reported on 5/12/2025)      sodium-potassium-magnesium sulfates (SUPREP) 17.5-3.13-1.6 GM/177ML solution oral solution Take as directed (Patient not taking:  "Reported on 5/12/2025) 177 mL 0     No facility-administered medications prior to visit.     No opioid medication identified on active medication list. I have reviewed chart for other potential  high risk medication/s and harmful drug interactions in the elderly.      Aspirin is not on active medication list.  Aspirin use is not indicated based on review of current medical condition/s. Risk of harm outweighs potential benefits.  .    Patient Active Problem List   Diagnosis    Primary insomnia    Gastroesophageal reflux disease without esophagitis    Folate deficiency    SVT (supraventricular tachycardia)    S/P atrioventricular lauren ablation    Paranoid schizophrenia    Sleep apnea    Personal history of tobacco use, presenting hazards to health     Advance Care Planning Advance Directive is not on file.  ACP discussion was held with the patient during this visit. Patient does not have an advance directive, declines further assistance.            Objective   Vitals:    05/12/25 1411   BP: 125/85   Pulse: 65   Temp: 98.5 °F (36.9 °C)   TempSrc: Oral   SpO2: 99%   Weight: 67.1 kg (148 lb)   Height: 175.3 cm (69\")   PainSc: 0-No pain       Estimated body mass index is 21.86 kg/m² as calculated from the following:    Height as of this encounter: 175.3 cm (69\").    Weight as of this encounter: 67.1 kg (148 lb).    BMI is within normal parameters. No other follow-up for BMI required.           Does the patient have evidence of cognitive impairment? No  Lab Results   Component Value Date    TRIG 135 05/09/2025    HDL 52 05/09/2025    LDL 91 05/09/2025    VLDL 24 05/09/2025                                                                                                Health  Risk Assessment    Smoking Status:  Social History     Tobacco Use   Smoking Status Every Day    Current packs/day: 0.25    Average packs/day: 0.3 packs/day for 18.9 years (4.7 ttl pk-yrs)    Types: Cigarettes    Start date: 7/6/2006   Smokeless " Tobacco Never   Tobacco Comments    Started age 15     Alcohol Consumption:  Social History     Substance and Sexual Activity   Alcohol Use Not Currently    Comment: quit 1 month ago (2023) was drinking 1/2 pint daily.       Fall Risk Screen  ROSALEEADI Fall Risk Assessment was completed, and patient is at LOW risk for falls.Assessment completed on:2025    Depression Screening   Little interest or pleasure in doing things? Not at all   Feeling down, depressed, or hopeless? Not at all   PHQ-2 Total Score 0      Health Habits and Functional and Cognitive Screenin/12/2025     2:15 PM   Functional & Cognitive Status   Do you have difficulty preparing food and eating? No   Do you have difficulty bathing yourself, getting dressed or grooming yourself? No   Do you have difficulty using the toilet? No   Do you have difficulty moving around from place to place? No   Do you have trouble with steps or getting out of a bed or a chair? No   Current Diet Unhealthy Diet   Dental Exam Up to date   Eye Exam Up to date   Exercise (times per week) 3 times per week   Current Exercises Include Walking   Do you need help using the phone?  No   Are you deaf or do you have serious difficulty hearing?  No   Do you need help to go to places out of walking distance? No   Do you need help shopping? Yes   Do you need help preparing meals?  No   Do you need help with housework?  No   Do you need help with laundry? No   Do you need help taking your medications? No   Do you need help managing money? Yes   Do you ever drive or ride in a car without wearing a seat belt? No   Have you felt unusual stress, anger or loneliness in the last month? Yes   Who do you live with? Spouse   If you need help, do you have trouble finding someone available to you? No   Have you been bothered in the last four weeks by sexual problems? No   Do you have difficulty concentrating, remembering or making decisions? Yes           Age-appropriate Screening  Schedule:  Refer to the list below for future screening recommendations based on patient's age, sex and/or medical conditions. Orders for these recommended tests are listed in the plan section. The patient has been provided with a written plan.    Health Maintenance List  Health Maintenance   Topic Date Due    Pneumococcal Vaccine 0-49 (1 of 2 - PCV) 11/08/2025 (Originally 2/4/2007)    COVID-19 Vaccine (3 - 2024-25 season) 11/12/2025 (Originally 9/1/2024)    INFLUENZA VACCINE  07/01/2025    LIPID PANEL  05/09/2026    ANNUAL WELLNESS VISIT  05/12/2026    TDAP/TD VACCINES (3 - Td or Tdap) 05/12/2027    HEPATITIS C SCREENING  Completed                                                                                                                                                CMS Preventative Services Quick Reference  Risk Factors Identified During Encounter  Immunizations Discussed/Encouraged: Tdap, Prevnar 20 (Pneumococcal 20-valent conjugate), Shingrix, COVID19, and RSV (Respiratory Syncytial Virus)  Dental Screening Recommended  Vision Screening Recommended    The above risks/problems have been discussed with the patient.  Pertinent information has been shared with the patient in the After Visit Summary.  An After Visit Summary and PPPS were made available to the patient.    Follow Up:   Next Medicare Wellness visit to be scheduled in 1 year.         Additional E&M Note during same encounter follows:  Patient has additional, significant, and separately identifiable condition(s)/problem(s) that require work above and beyond the Medicare Wellness Visit     Chief Complaint  Medicare Wellness-subsequent (Patient is accompanied by Clover, their girlfriend, and states it is okay to discuss medical information with them in the room. )    Subjective   HPI    Pt is also being seen for routine visit. He goes to [facility] for anxiety/depression and paranoid schizophrenia. He is on wellbutrin 150mg daily, buspar  "15mg TID, Prozac 60mg daily, hydroxyzine 50mg as needed, zyprexa 15mg at night and trazodone 50 -100mg at night.      GERD well under control with prilosec 20mg daily. Has been to gastro.      Patient is taking naltrexone for opioid addiction in remission.    Smokes 1/3 ppd and has no interested in quitting today.       No acute concerns or issues today.         Objective   Vital Signs:  /85   Pulse 65   Temp 98.5 °F (36.9 °C) (Oral)   Ht 175.3 cm (69\")   Wt 67.1 kg (148 lb)   SpO2 99%   BMI 21.86 kg/m²   Physical Exam  Vitals reviewed.   Constitutional:       General: He is not in acute distress.     Appearance: He is well-developed.   HENT:      Head: Normocephalic and atraumatic.   Eyes:      Conjunctiva/sclera: Conjunctivae normal.      Pupils: Pupils are equal, round, and reactive to light.   Neck:      Vascular: No carotid bruit.   Cardiovascular:      Rate and Rhythm: Normal rate and regular rhythm.      Heart sounds: Normal heart sounds. No murmur heard.  Pulmonary:      Effort: Pulmonary effort is normal. No respiratory distress.      Breath sounds: Normal breath sounds.   Skin:     General: Skin is warm and dry.   Neurological:      Mental Status: He is alert and oriented to person, place, and time.   Psychiatric:         Mood and Affect: Mood and affect normal.         Behavior: Behavior normal.         Thought Content: Thought content normal.         Judgment: Judgment normal.         The following data was reviewed by: CHARISSE Vera on 05/12/2025:    Lab Results   Component Value Date    GLUCOSE 97 05/09/2025    BUN 10 05/09/2025    CREATININE 1.19 05/09/2025    EGFR 80.7 05/09/2025    BCR 8.4 05/09/2025    K 4.0 05/09/2025    CO2 28.0 05/09/2025    CALCIUM 8.8 05/09/2025    ALBUMIN 4.3 05/09/2025    BILITOT 0.3 05/09/2025    AST 20 05/09/2025    ALT 28 05/09/2025     Lab Results   Component Value Date    HGBA1C 5.4 03/05/2021     Lab Results   Component Value Date    WBC 6.43 " 10/30/2023    HGB 15.7 10/30/2023    HCT 48.8 10/30/2023    MCV 88.2 10/30/2023     10/30/2023     Lab Results   Component Value Date    CHOL 167 05/09/2025    CHOL 197 05/06/2024    CHOL 195 10/30/2023    CHLPL 235 (H) 03/05/2021     Lab Results   Component Value Date    TRIG 135 05/09/2025    TRIG 155 (H) 05/06/2024    TRIG 299 (H) 10/30/2023     Lab Results   Component Value Date    HDL 52 05/09/2025    HDL 42 05/06/2024    HDL 35 (L) 10/30/2023     Lab Results   Component Value Date    LDL 91 05/09/2025     (H) 05/06/2024     (H) 10/30/2023     Lab Results   Component Value Date    TSH 1.870 10/30/2023          Assessment and Plan      Personal history of tobacco use, presenting hazards to health  Patient strongly encouraged to consider tobacco cessation.  Potential health complications discussed.  Patient is aware.  Patient is not interested in cessation at this time.  Encouraged patient to reach out to the office when they are ready to quit.         Gastroesophageal reflux disease without esophagitis  Stable. Continue prilosec 20mg daily.        Encounter for screening for cardiovascular disorders  Will notify patient of results and treat accordingly.  Heart healthy diet.   Orders:    Comprehensive Metabolic Panel; Future    Lipid Panel; Future     Lab orders placed for next routine follow up.     Patient to notify office with any acute concerns or issues.  Patient verbalizes understanding, agrees with plan of care and has no further questions upon discharge.    Please note that portions of this note were completed with a voice recognition program.         Follow Up   Return in about 1 year (around 5/12/2026) for Medicare Wellness.  Patient was given instructions and counseling regarding his condition or for health maintenance advice. Please see specific information pulled into the AVS if appropriate.

## 2025-06-09 ENCOUNTER — OFFICE VISIT (OUTPATIENT)
Dept: FAMILY MEDICINE CLINIC | Age: 35
End: 2025-06-09
Payer: MEDICARE

## 2025-06-09 VITALS
WEIGHT: 151.4 LBS | BODY MASS INDEX: 22.42 KG/M2 | TEMPERATURE: 98.9 F | DIASTOLIC BLOOD PRESSURE: 82 MMHG | HEART RATE: 79 BPM | HEIGHT: 69 IN | SYSTOLIC BLOOD PRESSURE: 117 MMHG | OXYGEN SATURATION: 97 %

## 2025-06-09 DIAGNOSIS — J06.9 VIRAL URI WITH COUGH: Primary | ICD-10-CM

## 2025-06-09 LAB
EXPIRATION DATE: NORMAL
EXPIRATION DATE: NORMAL
FLUAV AG UPPER RESP QL IA.RAPID: NOT DETECTED
FLUBV AG UPPER RESP QL IA.RAPID: NOT DETECTED
INTERNAL CONTROL: NORMAL
INTERNAL CONTROL: NORMAL
Lab: NORMAL
Lab: NORMAL
S PYO AG THROAT QL: NEGATIVE
SARS-COV-2 AG UPPER RESP QL IA.RAPID: NOT DETECTED

## 2025-06-09 PROCEDURE — 1160F RVW MEDS BY RX/DR IN RCRD: CPT | Performed by: NURSE PRACTITIONER

## 2025-06-09 PROCEDURE — 1126F AMNT PAIN NOTED NONE PRSNT: CPT | Performed by: NURSE PRACTITIONER

## 2025-06-09 PROCEDURE — 87081 CULTURE SCREEN ONLY: CPT | Performed by: NURSE PRACTITIONER

## 2025-06-09 PROCEDURE — 87880 STREP A ASSAY W/OPTIC: CPT | Performed by: NURSE PRACTITIONER

## 2025-06-09 PROCEDURE — 87428 SARSCOV & INF VIR A&B AG IA: CPT | Performed by: NURSE PRACTITIONER

## 2025-06-09 PROCEDURE — 99213 OFFICE O/P EST LOW 20 MIN: CPT | Performed by: NURSE PRACTITIONER

## 2025-06-09 PROCEDURE — 1159F MED LIST DOCD IN RCRD: CPT | Performed by: NURSE PRACTITIONER

## 2025-06-09 NOTE — PROGRESS NOTES
"Chief Complaint  Sore Throat    Subjective          Albert Black presents to [facility] for an acute visit complaining of sore throat, cough, chills x 2 days. Denies chills,  ear pain, abdominal pain, flank pain, nausea, vomiting, diarrhea, loss of taste or smell, shortness of air or chest pain.  Significant other has same symptoms.  She is on immunosuppressant.. Pt has tried over-the-counter cold and flu medications at home to help with symptoms.         Objective   Vital Signs:   Vitals:    06/09/25 1455   BP: 117/82   BP Location: Left arm   Patient Position: Sitting   Cuff Size: Adult   Pulse: 79   Temp: 98.9 °F (37.2 °C)   TempSrc: Oral   SpO2: 97%   Weight: 68.7 kg (151 lb 6.4 oz)   Height: 175.3 cm (69.02\")       Body mass index is 22.35 kg/m².  BMI is within normal parameters. No other follow-up for BMI required.       Physical Exam  Constitutional:       Appearance: He is ill-appearing.   HENT:      Right Ear: Tympanic membrane, ear canal and external ear normal.      Left Ear: Tympanic membrane, ear canal and external ear normal.      Nose: Congestion and rhinorrhea present.      Mouth/Throat:      Mouth: Mucous membranes are moist.      Pharynx: Posterior oropharyngeal erythema present. No oropharyngeal exudate.   Eyes:      Conjunctiva/sclera: Conjunctivae normal.      Pupils: Pupils are equal, round, and reactive to light.   Cardiovascular:      Rate and Rhythm: Normal rate and regular rhythm.      Heart sounds: Normal heart sounds. No murmur heard.  Pulmonary:      Effort: Pulmonary effort is normal. No respiratory distress.      Breath sounds: Normal breath sounds.   Lymphadenopathy:      Cervical: No cervical adenopathy.   Skin:     General: Skin is warm and dry.   Neurological:      Mental Status: He is alert and oriented to person, place, and time.   Psychiatric:         Mood and Affect: Mood normal.         Behavior: Behavior normal.         Thought Content: " Thought content normal.         Judgment: Judgment normal.                        Assessment and Plan    Assessment & Plan  Viral URI with cough  Rapid strep and flu/COVID were negative.  Increase fluid intake and rest.  Tylenol/ibuprofen as needed.  Over-the-counter cold and flu medications.  Zyrtec and Flonase.  If symptoms continue by the end of the week or into next week, notify office and antibiotic and steroid will be sent in.   Orders:    POCT SARS-CoV-2 Antigen ERICA + Flu    POCT rapid strep A    Beta Strep Culture, Throat - , Throat; Future             Patient to notify office with any acute concerns or issues.  Patient verbalizes understanding, agrees with plan of care and has no further questions upon discharge.    Please note that portions of this note were completed with a voice recognition program.      Follow Up    Return if symptoms worsen or fail to improve.  Patient was given instructions and counseling regarding his condition or for health maintenance advice. Please see specific information pulled into the AVS if appropriate.       Current Outpatient Medications:     buPROPion XL (WELLBUTRIN XL) 150 MG 24 hr tablet, Take 1 tablet by mouth Daily., Disp: , Rfl:     busPIRone (BUSPAR) 15 MG tablet, Take 1 tablet by mouth 3 times a day., Disp: , Rfl:     FLUoxetine (PROzac) 20 MG capsule, Take 3 capsules by mouth Daily., Disp: , Rfl:     folic acid (FOLVITE) 1 MG tablet, Take 1 tablet by mouth Daily., Disp: , Rfl:     hydrOXYzine (ATARAX) 50 MG tablet, Take 1 tablet by mouth As Needed for Itching., Disp: , Rfl:     naltrexone (DEPADE) 50 MG tablet, Take 1 tablet by mouth Daily., Disp: , Rfl:     OLANZapine (zyPREXA) 15 MG tablet, Take 1 tablet by mouth every night at bedtime., Disp: , Rfl:     omeprazole (priLOSEC) 20 MG capsule, Take 1 capsule by mouth Daily., Disp: 90 capsule, Rfl: 3    traZODone (DESYREL) 100 MG tablet, Take 0.5-1 tablets by mouth At Night As Needed., Disp: , Rfl:   There are no  discontinued medications.

## 2025-06-11 LAB — BACTERIA SPEC AEROBE CULT: NORMAL
